# Patient Record
Sex: FEMALE | Race: WHITE
[De-identification: names, ages, dates, MRNs, and addresses within clinical notes are randomized per-mention and may not be internally consistent; named-entity substitution may affect disease eponyms.]

---

## 2017-05-11 ENCOUNTER — HOSPITAL ENCOUNTER (EMERGENCY)
Dept: HOSPITAL 8 - ED | Age: 57
Discharge: HOME | End: 2017-05-11
Payer: MEDICAID

## 2017-05-11 VITALS — WEIGHT: 178.13 LBS | BODY MASS INDEX: 27 KG/M2 | HEIGHT: 68 IN

## 2017-05-11 VITALS — SYSTOLIC BLOOD PRESSURE: 121 MMHG | DIASTOLIC BLOOD PRESSURE: 61 MMHG

## 2017-05-11 DIAGNOSIS — F17.200: ICD-10-CM

## 2017-05-11 DIAGNOSIS — I48.91: ICD-10-CM

## 2017-05-11 DIAGNOSIS — M71.21: Primary | ICD-10-CM

## 2017-05-11 DIAGNOSIS — I10: ICD-10-CM

## 2017-05-11 DIAGNOSIS — R42: ICD-10-CM

## 2017-05-11 LAB — BUN SERPL-MCNC: 15 MG/DL (ref 7–18)

## 2017-05-11 PROCEDURE — 99285 EMERGENCY DEPT VISIT HI MDM: CPT

## 2017-05-11 PROCEDURE — 93005 ELECTROCARDIOGRAM TRACING: CPT

## 2017-05-11 PROCEDURE — 93971 EXTREMITY STUDY: CPT

## 2017-05-11 PROCEDURE — 81003 URINALYSIS AUTO W/O SCOPE: CPT

## 2017-05-11 PROCEDURE — 96360 HYDRATION IV INFUSION INIT: CPT

## 2017-05-11 PROCEDURE — 82040 ASSAY OF SERUM ALBUMIN: CPT

## 2017-05-11 PROCEDURE — 36415 COLL VENOUS BLD VENIPUNCTURE: CPT

## 2017-05-11 PROCEDURE — 80048 BASIC METABOLIC PNL TOTAL CA: CPT

## 2017-05-11 PROCEDURE — 85025 COMPLETE CBC W/AUTO DIFF WBC: CPT

## 2017-06-27 ENCOUNTER — HOSPITAL ENCOUNTER (EMERGENCY)
Dept: HOSPITAL 8 - ED | Age: 57
Discharge: HOME | End: 2017-06-27
Payer: MEDICAID

## 2017-06-27 VITALS — SYSTOLIC BLOOD PRESSURE: 111 MMHG | DIASTOLIC BLOOD PRESSURE: 65 MMHG

## 2017-06-27 VITALS — BODY MASS INDEX: 26.66 KG/M2 | WEIGHT: 175.93 LBS | HEIGHT: 68 IN
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
## 2017-06-27 DIAGNOSIS — S22.41XA: Primary | ICD-10-CM
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
## 2017-06-27 DIAGNOSIS — I48.91: ICD-10-CM

## 2017-06-27 DIAGNOSIS — I10: ICD-10-CM

## 2017-06-27 DIAGNOSIS — F17.210: ICD-10-CM

## 2017-06-27 PROCEDURE — 99284 EMERGENCY DEPT VISIT MOD MDM: CPT

## 2017-11-25 ENCOUNTER — HOSPITAL ENCOUNTER (EMERGENCY)
Dept: HOSPITAL 8 - ED | Age: 57
Discharge: HOME | End: 2017-11-25
Payer: MEDICAID

## 2017-11-25 VITALS — DIASTOLIC BLOOD PRESSURE: 65 MMHG | SYSTOLIC BLOOD PRESSURE: 128 MMHG

## 2017-11-25 VITALS — WEIGHT: 171.96 LBS | BODY MASS INDEX: 26.06 KG/M2 | HEIGHT: 68 IN

## 2017-11-25 DIAGNOSIS — Z88.1: ICD-10-CM

## 2017-11-25 DIAGNOSIS — M19.90: ICD-10-CM

## 2017-11-25 DIAGNOSIS — L24.9: Primary | ICD-10-CM

## 2017-11-25 DIAGNOSIS — E03.9: ICD-10-CM

## 2017-11-25 DIAGNOSIS — I10: ICD-10-CM

## 2017-11-25 DIAGNOSIS — E78.5: ICD-10-CM

## 2017-11-25 DIAGNOSIS — I48.91: ICD-10-CM

## 2017-11-25 LAB
AST SERPL-CCNC: 27 U/L (ref 15–37)
BUN SERPL-MCNC: 14 MG/DL (ref 7–18)
HCT VFR BLD CALC: 41.8 % (ref 34.6–47.8)
HGB BLD-MCNC: 14 G/DL (ref 11.7–16.4)
PATH.CAST-FLAG: (no result)
SPERM-FLAG: (no result)
SRC-FLAG: (no result)
WBC # BLD AUTO: 6.9 X10^3/UL (ref 3.4–10)
XTAL-FLAG: (no result)
YLC-FLAG: (no result)

## 2017-11-25 PROCEDURE — 36415 COLL VENOUS BLD VENIPUNCTURE: CPT

## 2017-11-25 PROCEDURE — 85025 COMPLETE CBC W/AUTO DIFF WBC: CPT

## 2017-11-25 PROCEDURE — 99284 EMERGENCY DEPT VISIT MOD MDM: CPT

## 2017-11-25 PROCEDURE — 80053 COMPREHEN METABOLIC PANEL: CPT

## 2017-11-25 PROCEDURE — 99283 EMERGENCY DEPT VISIT LOW MDM: CPT

## 2017-11-25 PROCEDURE — 81001 URINALYSIS AUTO W/SCOPE: CPT

## 2018-01-22 ENCOUNTER — HOSPITAL ENCOUNTER (OUTPATIENT)
Dept: HOSPITAL 8 - CVU | Age: 58
Discharge: HOME | End: 2018-01-22
Attending: INTERNAL MEDICINE
Payer: MEDICAID

## 2018-01-22 DIAGNOSIS — I10: ICD-10-CM

## 2018-01-22 DIAGNOSIS — I07.1: Primary | ICD-10-CM

## 2018-01-22 DIAGNOSIS — F17.200: ICD-10-CM

## 2018-01-22 DIAGNOSIS — Z98.890: ICD-10-CM

## 2018-01-22 DIAGNOSIS — I48.91: ICD-10-CM

## 2018-01-22 PROCEDURE — 93306 TTE W/DOPPLER COMPLETE: CPT

## 2018-08-13 ENCOUNTER — HOSPITAL ENCOUNTER (EMERGENCY)
Dept: HOSPITAL 8 - ED | Age: 58
Discharge: HOME | End: 2018-08-13
Payer: MEDICAID

## 2018-08-13 VITALS — SYSTOLIC BLOOD PRESSURE: 108 MMHG | DIASTOLIC BLOOD PRESSURE: 61 MMHG

## 2018-08-13 VITALS — HEIGHT: 68 IN | WEIGHT: 163.14 LBS | BODY MASS INDEX: 24.73 KG/M2

## 2018-08-13 DIAGNOSIS — E03.9: ICD-10-CM

## 2018-08-13 DIAGNOSIS — I48.2: Primary | ICD-10-CM

## 2018-08-13 DIAGNOSIS — M19.90: ICD-10-CM

## 2018-08-13 DIAGNOSIS — I10: ICD-10-CM

## 2018-08-13 DIAGNOSIS — E78.5: ICD-10-CM

## 2018-08-13 DIAGNOSIS — F17.210: ICD-10-CM

## 2018-08-13 LAB
ALBUMIN SERPL-MCNC: 3.8 G/DL (ref 3.4–5)
ALP SERPL-CCNC: 172 U/L (ref 45–117)
ALT SERPL-CCNC: 56 U/L (ref 12–78)
ANION GAP SERPL CALC-SCNC: 8 MMOL/L (ref 5–15)
BASOPHILS # BLD AUTO: 0.11 X10^3/UL (ref 0–0.1)
BASOPHILS NFR BLD AUTO: 1 % (ref 0–1)
BILIRUB SERPL-MCNC: 1.6 MG/DL (ref 0.2–1)
CALCIUM SERPL-MCNC: 9.3 MG/DL (ref 8.5–10.1)
CHLORIDE SERPL-SCNC: 110 MMOL/L (ref 98–107)
CREAT SERPL-MCNC: 0.93 MG/DL (ref 0.55–1.02)
EOSINOPHIL # BLD AUTO: 0.14 X10^3/UL (ref 0–0.4)
EOSINOPHIL NFR BLD AUTO: 1 % (ref 1–7)
ERYTHROCYTE [DISTWIDTH] IN BLOOD BY AUTOMATED COUNT: 14.1 % (ref 9.6–15.2)
LYMPHOCYTES # BLD AUTO: 2.97 X10^3/UL (ref 1–3.4)
LYMPHOCYTES NFR BLD AUTO: 30 % (ref 22–44)
MCH RBC QN AUTO: 28.1 PG (ref 27–34.8)
MCHC RBC AUTO-ENTMCNC: 33.2 G/DL (ref 32.4–35.8)
MCV RBC AUTO: 84.7 FL (ref 80–100)
MD: NO
MONOCYTES # BLD AUTO: 0.77 X10^3/UL (ref 0.2–0.8)
MONOCYTES NFR BLD AUTO: 8 % (ref 2–9)
NEUTROPHILS # BLD AUTO: 5.91 X10^3/UL (ref 1.8–6.8)
NEUTROPHILS NFR BLD AUTO: 60 % (ref 42–75)
PLATELET # BLD AUTO: 308 X10^3/UL (ref 130–400)
PMV BLD AUTO: 9.4 FL (ref 7.4–10.4)
PROT SERPL-MCNC: 7.5 G/DL (ref 6.4–8.2)
RBC # BLD AUTO: 5.09 X10^6/UL (ref 3.82–5.3)
T4 FREE SERPL-MCNC: 1.64 NG/DL (ref 0.76–1.46)
TROPONIN I SERPL-MCNC: < 0.015 NG/ML (ref 0–0.04)
TSH SERPL-ACNC: 0.07 MIU/L (ref 0.36–3.74)

## 2018-08-13 PROCEDURE — 84439 ASSAY OF FREE THYROXINE: CPT

## 2018-08-13 PROCEDURE — 96372 THER/PROPH/DIAG INJ SC/IM: CPT

## 2018-08-13 PROCEDURE — 36415 COLL VENOUS BLD VENIPUNCTURE: CPT

## 2018-08-13 PROCEDURE — 93005 ELECTROCARDIOGRAM TRACING: CPT

## 2018-08-13 PROCEDURE — 85025 COMPLETE CBC W/AUTO DIFF WBC: CPT

## 2018-08-13 PROCEDURE — 99285 EMERGENCY DEPT VISIT HI MDM: CPT

## 2018-08-13 PROCEDURE — 84443 ASSAY THYROID STIM HORMONE: CPT

## 2018-08-13 PROCEDURE — 71045 X-RAY EXAM CHEST 1 VIEW: CPT

## 2018-08-13 PROCEDURE — 80053 COMPREHEN METABOLIC PANEL: CPT

## 2018-08-13 PROCEDURE — 83735 ASSAY OF MAGNESIUM: CPT

## 2018-08-13 PROCEDURE — 84484 ASSAY OF TROPONIN QUANT: CPT

## 2019-07-09 ENCOUNTER — OFFICE VISIT (OUTPATIENT)
Dept: NEUROLOGY | Facility: MEDICAL CENTER | Age: 59
End: 2019-07-09
Payer: MEDICAID

## 2019-07-09 VITALS
SYSTOLIC BLOOD PRESSURE: 118 MMHG | TEMPERATURE: 97.7 F | HEIGHT: 68 IN | HEART RATE: 40 BPM | BODY MASS INDEX: 25.16 KG/M2 | OXYGEN SATURATION: 99 % | DIASTOLIC BLOOD PRESSURE: 78 MMHG | WEIGHT: 166 LBS

## 2019-07-09 DIAGNOSIS — R41.3 MEMORY LOSS: ICD-10-CM

## 2019-07-09 DIAGNOSIS — I67.9 CEREBROVASCULAR DISEASE: ICD-10-CM

## 2019-07-09 PROCEDURE — 99205 OFFICE O/P NEW HI 60 MIN: CPT | Performed by: PSYCHIATRY & NEUROLOGY

## 2019-07-09 RX ORDER — ATORVASTATIN CALCIUM 40 MG/1
40 TABLET, FILM COATED ORAL NIGHTLY
COMMUNITY
End: 2021-06-07

## 2019-07-09 RX ORDER — PHENOL 1.4 %
AEROSOL, SPRAY (ML) MUCOUS MEMBRANE
COMMUNITY

## 2019-07-09 ASSESSMENT — ENCOUNTER SYMPTOMS
EYE DISCHARGE: 0
FALLS: 0
ABDOMINAL PAIN: 0
WEIGHT LOSS: 0
SORE THROAT: 0
BRUISES/BLEEDS EASILY: 0
HALLUCINATIONS: 0
SHORTNESS OF BREATH: 0
FEVER: 0

## 2019-07-09 NOTE — LETTER
[facility]   [address], [address]  [place] [address]  Phone: [phone]  Fax: [phone]              Yaneth Hendricks  1960    Encounter Date: 7/9/2019    Marion Miguel M.D.          PROGRESS NOTE:  Chief Complaint   Patient presents with   • New Patient     Disorientation, unspecified/     Patient is referred by Lis Small for initial consult.    History of present illness:  Yaneth Hendricks 59 y.o. female presents today for disorientation unspecified.   History is obtained from patient and significant other.  and Patient is accompanied by boyfriend, Pardeep.     Duration/timing: ~Summer 2018, with progression  Context: Memory loss: started off as forgetfulness, getting lost while driving in her car and not knowing where she was going, a regular physician. She got off the freeway and eventually found her way home perhaps. She has lived in [place] for over 10 years. She drives now - doesn't get lost every time, occurs daily going to doctor appointments and getting lost going home from familiar places. Forgets to turn off the stove after taking pan off the stove - she remembered then turned it off after 8 minutes x 2 in couple of months. Can't remember dates, times, appointments. 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 Responsibility at home: laundry, cooking, cleaning - will forget what she was doing and will go back to do it. She pays her car insurance and electric bill without issue - with cash without gross mistakes.   Location: memory circuits  Quality: loss  Severity: moderate   Modifying factors: worse with time  Associated signs/symptoms: hit head hard Summer 2018 at neighbor house sitting on porch and hit her head on hand rail with confusion afterwards without LOC, followed by neck pain/dizziness; intermittent anxiety but not severe  Denies: bladder incontinence, bowel incontinence,  dizziness, headaches, vision changes, weakness, numbness/tingling, swallowing difficulties, speech disturbance, incoordination, hearing loss, loss of consciousness, hallucinations, REM behavior disorder, seizures, abnormal movements, falls, snoring/apnea during sleep, HIV or syphilis risk factors and loss of gaps in time, staring spells     Past medical history:   Past Medical History:   Diagnosis Date   • Atrial fibrillation with RVR (HCC)     with cardioversion   • Hypertension    • Hypothyroid      Past surgical history:   Past Surgical History:   Procedure Laterality Date   • RECOVERY  4/8/2013    Performed by [facility]   • APPENDECTOMY      2006   • OTHER CARDIAC SURGERY      ablation, open heart   • PB ANESTH,OPEN HEART SURGERY+PUMP      ASD defect     Family history:   Family History   Problem Relation Age of Onset   • Cancer Mother 70        brain   • Suicide Attempts Father    • Suicide Attempts Brother    • Suicide Attempts Son     No dementia in the family.     Social history:   Social History   • Marital status:      Social History Main Topics   • Smoking status: Current Every Day Smoker     Packs/day: 0.50     Types: Cigarettes   • Smokeless tobacco: Never Used   • Alcohol use Yes      Comment: occasional; no hx of EtOH abuse   • Drug use: No       Current medications:   Current Outpatient Prescriptions   Medication   • hydrocodone-acetaminophen (NORCO) 5-325 MG Tab per tablet   • levothyroxine (SYNTHROID) 88 MCG TABS   • metoprolol (LOPRESSOR) 50 MG TABS   • pravastatin (PRAVACHOL) 80 MG tablet   • lisinopril (PRINIVIL) 20 MG TABS   • promethazine-codeine (PHENERGAN-CODEINE) 6.25-10 MG/5ML SYRP   • albuterol (VENTOLIN OR PROVENTIL) 108 (90 BASE) MCG/ACT AERS   • dabigatran (PRADAXA) 150 MG CAPS capsule   • doxycycline (VIBRAMYCIN) 100 MG TABS   • POTASSIUM PO   • Multiple Vitamins-Minerals (MULTIVITAMIN PO)     No current facility-administered medications  "for this visit.        Medication Allergy:  Allergies   Allergen Reactions   • Codeine Vomiting       Review of Systems   Constitutional: Negative for fever and weight loss.   HENT: Negative for sore throat.    Eyes: Negative for discharge.   Respiratory: Negative for shortness of breath.    Cardiovascular: Negative for leg swelling.   Gastrointestinal: Negative for abdominal pain.   Genitourinary: Negative for dysuria.   Musculoskeletal: Negative for falls.   Skin: Negative for rash.   Neurological:        As per HPI   Endo/Heme/Allergies: Does not bruise/bleed easily.   Psychiatric/Behavioral: Negative for hallucinations.       Physical examination:   Vitals:    07/09/19 0754   BP: 118/78   BP Location: Left arm   Patient Position: Sitting   BP Cuff Size: Adult   Pulse: (!) 40   Temp: 36.5 °C (97.7 °F)   TempSrc: Temporal   SpO2: 99%   Weight: 75.3 kg (166 lb)   Height: 1.727 m (5' 8\")     General: Patient in well nourished in no apparent distress.  Eyes: Ophthalmoscopic examination performed but discs cannot be visualized well enough to characterize bilaterally.  HENT: Normocephalic, atraumatic. Mallapatic score 3  Cardiovascular: No lower extremity edema.  Respiratory: Normal respiratory effort.   Skin: No appreciable signs of acute rashes or bruising.   Musculoskeletal: No signs of joint or muscle swelling.   Psychiatric: Pleasant.     NEUROLOGICAL EXAM:   Mental status: Awake, alert and fully oriented to person, place and time. Normal attention and concentration.   Speech and language: Speech is fluent without errors and clear.  Cranial nerve exam:  II: Pupils are equally round and reactive to light. Visual fields are intact by confrontation.  III, IV, VI: EOMI, no diplopia, no ptosis.  V: Sensation to light touch is normal over V1-3 distributions bilaterally.  .  VII: Facial movements are symmetrical. There is no facial droop. .  VIII: Hearing intact to soft speech and finger rub bilaterally  IX: Palate " elevates symmetrically, uvula is midline. Dysarthria is not present.  XI: Shoulder shrug are symmetrical and strong.   XII: Tongue protrudes midline.    Motor exam:  Muscle tone is normal in all 4 limbs. and No abnormal movements appreciated.    Muscle strength:     Right  Left  Deltoid   5/5  5/5      Biceps   5/5  5/5  Triceps  5/5  5/5   Wrist extensors 5/5  5/5  Wrist flexors  5/5  5/5     5/5  5/5  Interossei  5/5  5/5  Thenar (APB)  NT/5  NT/5   Hip flexors  5/5  5/5  Quadriceps  5/5  5/5    Hamstrings  5/5  5/5  Dorsiflexors  5/5  5/5  Plantarflexors  5/5  5/5  Toe extension  NT/5  NT/5  NT = not tested    Sensory exam:  Intact to Light touch, Temperature and Vibration in bilateral upper and lower extremity.    Deep tendon reflexes:       Right  Left  Biceps   1/4  1/4  Triceps  1/4  1/4  Brachioradialis 1/4  1/4  Knee jerk  1/4  1/4  Ankle jerk  0/4  0/4   bilateral toes are downgoing to plantar stimulation..    Coordination: shows a normal finger-nose-finger and heel to shin bilaterally.   Gait: Casual gait is normal., Heel walk is normal. and Toe walk is normal.      ANCILLARY DATA REVIEWED:   Lab Data Review:  Lab Results   Component Value Date/Time    WBC 7.7 04/06/2013 02:30 AM    RBC 4.48 04/06/2013 02:30 AM    HEMOGLOBIN 12.4 04/06/2013 02:30 AM    HEMATOCRIT 38.7 04/06/2013 02:30 AM    MCV 86.5 04/06/2013 02:30 AM    MCH 27.7 04/06/2013 02:30 AM    MCHC 32.0 04/06/2013 02:30 AM    MPV 9.7 04/06/2013 02:30 AM    NEUTSPOLYS 55.1 04/06/2013 02:30 AM    LYMPHOCYTES 32.8 04/06/2013 02:30 AM    MONOCYTES 9.4 (H) 04/06/2013 02:30 AM    EOSINOPHILS 2.1 04/06/2013 02:30 AM    BASOPHILS 0.6 04/06/2013 02:30 AM    HYPOCHROMIA 1+ 04/06/2013 02:30 AM      Lab Results   Component Value Date/Time    SODIUM 138 04/06/2013 02:30 AM    POTASSIUM 3.9 04/06/2013 02:30 AM    CHLORIDE 111 04/06/2013 02:30 AM    CO2 22 04/06/2013 02:30 AM    GLUCOSE 91 04/06/2013 02:30 AM    BUN 12 04/06/2013 02:30 AM    CREATININE  0.59 04/06/2013 02:30 AM       Lab Results  Component Value Date/Time   ASTSGOT 52 (H) 04/05/2013 1435   ALTSGPT 44 04/05/2013 1435   ALKPHOSPHAT 117 (H) 04/05/2013 1435   ALBUMIN 3.9 04/05/2013 1435 2013 TSH was elevated at 7.5 with normal free T4.    Imaging:  CT head without contrast April 2013 performed for syncope:   1. Negative noncontrast CT scan of the brain.  2. Mild paranasal sinus mucosal thickening.    Echocardiogram 2013:  ASD repair no shunt seen by color Doppler.  Mildly enlarged RV w normal function.  Mildly dilated right atrium.  Left ventricular ejection fraction is 55% to 60%.  Right ventricular systolic pressure is estimated to be 42-47 mmHg   consistent with moderate pulmonary hypertension.    MRI brain with/without 9/2018 performed for disorientation:  Mild to mod diffuse volume loss and mod to severe chronic microvascular ischemic changes.    CT head without 7/5/2019: chronic ischemic changes as per MRI brain, no acute finding    Records reviewed: Chart reviewed patient was seen in the emergency department in 2016 for knee pain.  Media tablet reviewed as well, no primary care notes available.        ASSESSMENT AND PLAN:    1. Memory loss: Suspicious for vascular dementia given processing difficulties and mostly executive function difficulties, moderate to severe chronic microvascular ischemic changes on MRI brain in September 2018.  Cannot rule out other neurodegenerative disease, metabolic cause-will work-up as below.  There is a concern for driving due to her reaction time so we will send her for occupational therapy for driving assessment and safety.  No other red flag symptoms or rapidly progressive symptoms suggest need for lumbar puncture at this point in time but may consider if there is change in clinical presentation.  - HIV AG/AB COMBO ASSAY SCREENING; Future  - RPR (SYPHILIS); Future  - VITAMIN B12; Future  - METHYLMALONIC ACID, SERUM; Future  - COPPER, SERUM; Future  - TSH  REFLEX TO T4  - REFERRAL TO NEURODIAGNOSTICS (EEG,EP,EMG/NCS/DBS) Modality Requested: EEG  - MR-BRAIN-W/O; Future-repeat due to severe worsening nature to reevaluate progression given her multiple vascular risk factors  - REFERRAL TO OCCUPATIONAL THERAPY Reason for Therapy: Eval/Treat/Report  -DMV form filled out and faxed, recommend patient hold off from driving until full thorough evaluation  -After work-up will likely need neuropsychological testing    2. Cerebrovascular disease: Evident on MRI brain in 2018 with moderate to severe microvascular ischemic changes given her history of hyperlipidemia, hypertension, history of atrial flutter now resolved and not on anticoagulation, smoking.  -Highly recommended smoking cessation  -Continue atorvastatin   -Optimization of blood pressure control of her primary care  -Recommend daily aspirin for stroke prevention    FOLLOW-UP: Return for 3-4 months.  To review work-up  EDUCATION AND COUNSELING:  -I personally discussed the following with the patient:   Diagnosis, prognosis, and treatment options discussed with patient at length.  , Recommend regular exercise, proper hydration, healthy diet and stress reduction. , Smoking cessation was discussed. , I have made the patient aware of mandatory reporting required by the law in the State of [place] regarding episodes of seizures, loss of consciousness, alteration of awareness, and/or concerns for driving safety as discussed today. The patient and family are responsible for reporting events to the DMV, instructions were provided. The patient verbalized understanding.  and Differential diagnosis    The patient understands and agrees that due to the complexity of his/her diagnosis, results of any testing and further recommendations will typically be discussed/made during a face to face encounter in my office. The patient and/or family further understands it is their responsibility to keep proper follow up.     Disclaimer  This  dictation was created using voice recognition software. I have made every reasonable attempt to avoid dictation errors, but this document may contain an error not identified before finalizing. If the error changes the accuracy of the document, I would appreciate it being brought to my attention. Thank you very much.     Marion Miguel MD  Neurology, Neurophysiology  [facility]      No Recipients

## 2019-07-09 NOTE — PROGRESS NOTES
Chief Complaint   Patient presents with   • New Patient     Disorientation, unspecified/     Patient is referred by Lis Small for initial consult.    History of present illness:  Yaneth Hendricks 59 y.o. female presents today for disorientation unspecified.   History is obtained from patient and significant other.  and Patient is accompanied by boyfriend, Pardeep.     Duration/timing: ~Summer 2018, with progression  Context: Memory loss: started off as forgetfulness, getting lost while driving in her car and not knowing where she was going, a regular physician. She got off the freeway and eventually found her way home perhaps. She has lived in [place] for over 10 years. She drives now - doesn't get lost every time, occurs daily going to doctor appointments and getting lost going home from familiar places. Forgets to turn off the stove after taking pan off the stove - she remembered then turned it off after 8 minutes x 2 in couple of months. Can't remember dates, times, appointments. 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 Responsibility at home: laundry, cooking, cleaning - will forget what she was doing and will go back to do it. She pays her car insurance and electric bill without issue - with cash without gross mistakes.   Location: memory circuits  Quality: loss  Severity: moderate   Modifying factors: worse with time  Associated signs/symptoms: hit head hard Summer 2018 at neighbor house sitting on porch and hit her head on hand rail with confusion afterwards without LOC, followed by neck pain/dizziness; intermittent anxiety but not severe  Denies: bladder incontinence, bowel incontinence, dizziness, headaches, vision changes, weakness, numbness/tingling, swallowing difficulties, speech disturbance, incoordination, hearing loss, loss of consciousness, hallucinations, REM behavior disorder, seizures, abnormal movements, falls,  snoring/apnea during sleep, HIV or syphilis risk factors and loss of gaps in time, staring spells     Past medical history:   Past Medical History:   Diagnosis Date   • Atrial fibrillation with RVR (HCC)     with cardioversion   • Hypertension    • Hypothyroid      Past surgical history:   Past Surgical History:   Procedure Laterality Date   • RECOVERY  4/8/2013    Performed by [facility]   • APPENDECTOMY      2006   • OTHER CARDIAC SURGERY      ablation, open heart   • PB ANESTH,OPEN HEART SURGERY+PUMP      ASD defect     Family history:   Family History   Problem Relation Age of Onset   • Cancer Mother 70        brain   • Suicide Attempts Father    • Suicide Attempts Brother    • Suicide Attempts Son     No dementia in the family.     Social history:   Social History   • Marital status:      Social History Main Topics   • Smoking status: Current Every Day Smoker     Packs/day: 0.50     Types: Cigarettes   • Smokeless tobacco: Never Used   • Alcohol use Yes      Comment: occasional; no hx of EtOH abuse   • Drug use: No       Current medications:   Current Outpatient Prescriptions   Medication   • hydrocodone-acetaminophen (NORCO) 5-325 MG Tab per tablet   • levothyroxine (SYNTHROID) 88 MCG TABS   • metoprolol (LOPRESSOR) 50 MG TABS   • pravastatin (PRAVACHOL) 80 MG tablet   • lisinopril (PRINIVIL) 20 MG TABS   • promethazine-codeine (PHENERGAN-CODEINE) 6.25-10 MG/5ML SYRP   • albuterol (VENTOLIN OR PROVENTIL) 108 (90 BASE) MCG/ACT AERS   • dabigatran (PRADAXA) 150 MG CAPS capsule   • doxycycline (VIBRAMYCIN) 100 MG TABS   • POTASSIUM PO   • Multiple Vitamins-Minerals (MULTIVITAMIN PO)     No current facility-administered medications for this visit.        Medication Allergy:  Allergies   Allergen Reactions   • Codeine Vomiting       Review of Systems   Constitutional: Negative for fever and weight loss.   HENT: Negative for sore throat.    Eyes: Negative for discharge.  "  Respiratory: Negative for shortness of breath.    Cardiovascular: Negative for leg swelling.   Gastrointestinal: Negative for abdominal pain.   Genitourinary: Negative for dysuria.   Musculoskeletal: Negative for falls.   Skin: Negative for rash.   Neurological:        As per HPI   Endo/Heme/Allergies: Does not bruise/bleed easily.   Psychiatric/Behavioral: Negative for hallucinations.       Physical examination:   Vitals:    07/09/19 0754   BP: 118/78   BP Location: Left arm   Patient Position: Sitting   BP Cuff Size: Adult   Pulse: (!) 40   Temp: 36.5 °C (97.7 °F)   TempSrc: Temporal   SpO2: 99%   Weight: 75.3 kg (166 lb)   Height: 1.727 m (5' 8\")     General: Patient in well nourished in no apparent distress.  Eyes: Ophthalmoscopic examination performed but discs cannot be visualized well enough to characterize bilaterally.  HENT: Normocephalic, atraumatic. Mallapatic score 3  Cardiovascular: No lower extremity edema.  Respiratory: Normal respiratory effort.   Skin: No appreciable signs of acute rashes or bruising.   Musculoskeletal: No signs of joint or muscle swelling.   Psychiatric: Pleasant.     NEUROLOGICAL EXAM:   Mental status: Awake, alert and fully oriented to person, place and time. Normal attention and concentration.   Speech and language: Speech is fluent without errors and clear.  Cranial nerve exam:  II: Pupils are equally round and reactive to light. Visual fields are intact by confrontation.  III, IV, VI: EOMI, no diplopia, no ptosis.  V: Sensation to light touch is normal over V1-3 distributions bilaterally.  .  VII: Facial movements are symmetrical. There is no facial droop. .  VIII: Hearing intact to soft speech and finger rub bilaterally  IX: Palate elevates symmetrically, uvula is midline. Dysarthria is not present.  XI: Shoulder shrug are symmetrical and strong.   XII: Tongue protrudes midline.    Motor exam:  Muscle tone is normal in all 4 limbs. and No abnormal movements " appreciated.    Muscle strength:     Right  Left  Deltoid   5/5  5/5      Biceps   5/5  5/5  Triceps  5/5  5/5   Wrist extensors 5/5  5/5  Wrist flexors  5/5  5/5     5/5  5/5  Interossei  5/5  5/5  Thenar (APB)  NT/5  NT/5   Hip flexors  5/5  5/5  Quadriceps  5/5  5/5    Hamstrings  5/5  5/5  Dorsiflexors  5/5  5/5  Plantarflexors  5/5  5/5  Toe extension  NT/5  NT/5  NT = not tested    Sensory exam:  Intact to Light touch, Temperature and Vibration in bilateral upper and lower extremity.    Deep tendon reflexes:       Right  Left  Biceps   1/4 1/4  Triceps  1/4 1/4  Brachioradialis 1/4 1/4  Knee jerk  1/4 1/4  Ankle jerk  0/4  0/4   bilateral toes are downgoing to plantar stimulation..    Coordination: shows a normal finger-nose-finger and heel to shin bilaterally.   Gait: Casual gait is normal., Heel walk is normal. and Toe walk is normal.      ANCILLARY DATA REVIEWED:   Lab Data Review:  Lab Results   Component Value Date/Time    WBC 7.7 04/06/2013 02:30 AM    RBC 4.48 04/06/2013 02:30 AM    HEMOGLOBIN 12.4 04/06/2013 02:30 AM    HEMATOCRIT 38.7 04/06/2013 02:30 AM    MCV 86.5 04/06/2013 02:30 AM    MCH 27.7 04/06/2013 02:30 AM    MCHC 32.0 04/06/2013 02:30 AM    MPV 9.7 04/06/2013 02:30 AM    NEUTSPOLYS 55.1 04/06/2013 02:30 AM    LYMPHOCYTES 32.8 04/06/2013 02:30 AM    MONOCYTES 9.4 (H) 04/06/2013 02:30 AM    EOSINOPHILS 2.1 04/06/2013 02:30 AM    BASOPHILS 0.6 04/06/2013 02:30 AM    HYPOCHROMIA 1+ 04/06/2013 02:30 AM      Lab Results   Component Value Date/Time    SODIUM 138 04/06/2013 02:30 AM    POTASSIUM 3.9 04/06/2013 02:30 AM    CHLORIDE 111 04/06/2013 02:30 AM    CO2 22 04/06/2013 02:30 AM    GLUCOSE 91 04/06/2013 02:30 AM    BUN 12 04/06/2013 02:30 AM    CREATININE 0.59 04/06/2013 02:30 AM       Lab Results  Component Value Date/Time   ASTSGOT 52 (H) 04/05/2013 1435   ALTSGPT 44 04/05/2013 1435   ALKPHOSPHAT 117 (H) 04/05/2013 1435   ALBUMIN 3.9 04/05/2013 1435 2013 TSH was elevated at 7.5  with normal free T4.    Imaging:  CT head without contrast April 2013 performed for syncope:   1. Negative noncontrast CT scan of the brain.  2. Mild paranasal sinus mucosal thickening.    Echocardiogram 2013:  ASD repair no shunt seen by color Doppler.  Mildly enlarged RV w normal function.  Mildly dilated right atrium.  Left ventricular ejection fraction is 55% to 60%.  Right ventricular systolic pressure is estimated to be 42-47 mmHg   consistent with moderate pulmonary hypertension.    MRI brain with/without 9/2018 performed for disorientation:  Mild to mod diffuse volume loss and mod to severe chronic microvascular ischemic changes.    CT head without 7/5/2019: chronic ischemic changes as per MRI brain, no acute finding    Records reviewed: Chart reviewed patient was seen in the emergency department in 2016 for knee pain.  Media tablet reviewed as well, no primary care notes available.        ASSESSMENT AND PLAN:    1. Memory loss: Suspicious for vascular dementia given processing difficulties and mostly executive function difficulties, moderate to severe chronic microvascular ischemic changes on MRI brain in September 2018.  Cannot rule out other neurodegenerative disease, metabolic cause-will work-up as below.  There is a concern for driving due to her reaction time so we will send her for occupational therapy for driving assessment and safety.  No other red flag symptoms or rapidly progressive symptoms suggest need for lumbar puncture at this point in time but may consider if there is change in clinical presentation.  - HIV AG/AB COMBO ASSAY SCREENING; Future  - RPR (SYPHILIS); Future  - VITAMIN B12; Future  - METHYLMALONIC ACID, SERUM; Future  - COPPER, SERUM; Future  - TSH REFLEX TO T4  - REFERRAL TO NEURODIAGNOSTICS (EEG,EP,EMG/NCS/DBS) Modality Requested: EEG  - MR-BRAIN-W/O; Future-repeat due to severe worsening nature to reevaluate progression given her multiple vascular risk factors  - REFERRAL TO  OCCUPATIONAL THERAPY Reason for Therapy: Eval/Treat/Report  -DMV form filled out and faxed, recommend patient hold off from driving until full thorough evaluation  -After work-up will likely need neuropsychological testing    2. Cerebrovascular disease: Evident on MRI brain in 2018 with moderate to severe microvascular ischemic changes given her history of hyperlipidemia, hypertension, history of atrial flutter now resolved and not on anticoagulation, smoking.  -Highly recommended smoking cessation  -Continue atorvastatin   -Optimization of blood pressure control of her primary care  -Recommend daily aspirin for stroke prevention    FOLLOW-UP: Return for 3-4 months.  To review work-up  EDUCATION AND COUNSELING:  -I personally discussed the following with the patient:   Diagnosis, prognosis, and treatment options discussed with patient at length.  , Recommend regular exercise, proper hydration, healthy diet and stress reduction. , Smoking cessation was discussed. , I have made the patient aware of mandatory reporting required by the law in the [facility] regarding episodes of seizures, loss of consciousness, alteration of awareness, and/or concerns for driving safety as discussed today. The patient and family are responsible for reporting events to the DMV, instructions were provided. The patient verbalized understanding.  and Differential diagnosis    The patient understands and agrees that due to the complexity of his/her diagnosis, results of any testing and further recommendations will typically be discussed/made during a face to face encounter in my office. The patient and/or family further understands it is their responsibility to keep proper follow up.     Disclaimer  This dictation was created using voice recognition software. I have made every reasonable attempt to avoid dictation errors, but this document may contain an error not identified before finalizing. If the error changes the accuracy of the  document, I would appreciate it being brought to my attention. Thank you very much.     Marion Miguel MD  Neurology, Neurophysiology  [facility]

## 2019-07-21 ENCOUNTER — HOSPITAL ENCOUNTER (OUTPATIENT)
Dept: RADIOLOGY | Facility: MEDICAL CENTER | Age: 59
End: 2019-07-21
Attending: PSYCHIATRY & NEUROLOGY
Payer: MEDICAID

## 2019-07-21 DIAGNOSIS — R41.3 MEMORY LOSS: ICD-10-CM

## 2019-07-21 PROCEDURE — 70551 MRI BRAIN STEM W/O DYE: CPT

## 2019-07-23 ENCOUNTER — DOCUMENTATION (OUTPATIENT)
Dept: NEUROLOGY | Facility: MEDICAL CENTER | Age: 59
End: 2019-07-23

## 2019-07-23 NOTE — PROGRESS NOTES
[facility] laboratory received July 2019.    Work-up as follows: Copper 142, normal  B12 362, normal MMA at 136  TSH slightly elevated 5.3 with normal free T4 1.5  HIV and RPR negative    Maroin Miguel MD  Neurology - Neurophysiology  [facility]

## 2019-09-04 ENCOUNTER — APPOINTMENT (OUTPATIENT)
Dept: OCCUPATIONAL THERAPY | Facility: REHABILITATION | Age: 59
End: 2019-09-04
Attending: PSYCHIATRY & NEUROLOGY
Payer: MEDICAID

## 2019-09-30 ENCOUNTER — OCCUPATIONAL THERAPY (OUTPATIENT)
Dept: OCCUPATIONAL THERAPY | Facility: REHABILITATION | Age: 59
End: 2019-09-30
Attending: PSYCHIATRY & NEUROLOGY
Payer: MEDICAID

## 2019-09-30 DIAGNOSIS — R41.3 MEMORY LOSS: ICD-10-CM

## 2019-09-30 PROCEDURE — 97530 THERAPEUTIC ACTIVITIES: CPT

## 2019-09-30 ASSESSMENT — BALANCE ASSESSMENTS
BALANCE - STANDING DYNAMIC: GOOD
BALANCE - SITTING STATIC: NORMAL
BALANCE - STANDING STATIC: GOOD
BALANCE - SITTING DYNAMIC: NORMAL
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
## 2019-10-01 ENCOUNTER — OFFICE VISIT (OUTPATIENT)
Dept: NEUROLOGY | Facility: MEDICAL CENTER | Age: 59
End: 2019-10-01
Payer: MEDICAID

## 2019-10-01 VITALS
OXYGEN SATURATION: 95 % | RESPIRATION RATE: 16 BRPM | HEIGHT: 68 IN | TEMPERATURE: 98.3 F | WEIGHT: 167 LBS | SYSTOLIC BLOOD PRESSURE: 94 MMHG | BODY MASS INDEX: 25.31 KG/M2 | HEART RATE: 75 BPM | DIASTOLIC BLOOD PRESSURE: 50 MMHG

## 2019-10-01 DIAGNOSIS — R41.3 MEMORY LOSS: ICD-10-CM

## 2019-10-01 PROCEDURE — 99214 OFFICE O/P EST MOD 30 MIN: CPT | Performed by: PSYCHIATRY & NEUROLOGY

## 2019-10-01 ASSESSMENT — ENCOUNTER SYMPTOMS
WEIGHT LOSS: 0
NERVOUS/ANXIOUS: 0
SORE THROAT: 0
FALLS: 0
SHORTNESS OF BREATH: 0
FEVER: 0
HALLUCINATIONS: 0
DEPRESSION: 0

## 2019-10-01 ASSESSMENT — PATIENT HEALTH QUESTIONNAIRE - PHQ9: CLINICAL INTERPRETATION OF PHQ2 SCORE: 0
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
## 2019-11-15 ENCOUNTER — HOSPITAL ENCOUNTER (EMERGENCY)
Dept: HOSPITAL 8 - ED | Age: 59
Discharge: HOME | End: 2019-11-15
Payer: MEDICAID

## 2019-11-15 VITALS — HEIGHT: 68 IN | BODY MASS INDEX: 25.59 KG/M2 | WEIGHT: 168.87 LBS

## 2019-11-15 VITALS — DIASTOLIC BLOOD PRESSURE: 53 MMHG | SYSTOLIC BLOOD PRESSURE: 116 MMHG

## 2019-11-15 DIAGNOSIS — H81.10: ICD-10-CM

## 2019-11-15 DIAGNOSIS — E78.5: ICD-10-CM

## 2019-11-15 DIAGNOSIS — R51: ICD-10-CM

## 2019-11-15 DIAGNOSIS — I10: ICD-10-CM

## 2019-11-15 DIAGNOSIS — E03.9: ICD-10-CM

## 2019-11-15 DIAGNOSIS — H81.399: Primary | ICD-10-CM

## 2019-11-15 DIAGNOSIS — I48.91: ICD-10-CM

## 2019-11-15 DIAGNOSIS — I25.2: ICD-10-CM

## 2019-11-15 LAB
ALBUMIN SERPL-MCNC: 3.4 G/DL (ref 3.4–5)
ALP SERPL-CCNC: 150 U/L (ref 45–117)
ALT SERPL-CCNC: 42 U/L (ref 12–78)
ANION GAP SERPL CALC-SCNC: 4 MMOL/L (ref 5–15)
BASOPHILS # BLD AUTO: 0.04 X10^3/UL (ref 0–0.1)
BASOPHILS NFR BLD AUTO: 1 % (ref 0–1)
BILIRUB SERPL-MCNC: 1.2 MG/DL (ref 0.2–1)
CALCIUM SERPL-MCNC: 9.1 MG/DL (ref 8.5–10.1)
CHLORIDE SERPL-SCNC: 111 MMOL/L (ref 98–107)
CREAT SERPL-MCNC: 0.73 MG/DL (ref 0.55–1.02)
CULTURE INDICATED?: NO
EOSINOPHIL # BLD AUTO: 0.08 X10^3/UL (ref 0–0.4)
EOSINOPHIL NFR BLD AUTO: 1 % (ref 1–7)
ERYTHROCYTE [DISTWIDTH] IN BLOOD BY AUTOMATED COUNT: 13.5 % (ref 9.6–15.2)
LYMPHOCYTES # BLD AUTO: 1.96 X10^3/UL (ref 1–3.4)
LYMPHOCYTES NFR BLD AUTO: 30 % (ref 22–44)
MCH RBC QN AUTO: 29 PG (ref 27–34.8)
MCHC RBC AUTO-ENTMCNC: 33.1 G/DL (ref 32.4–35.8)
MCV RBC AUTO: 87.4 FL (ref 80–100)
MD: NO
MICROSCOPIC: (no result)
MONOCYTES # BLD AUTO: 0.45 X10^3/UL (ref 0.2–0.8)
MONOCYTES NFR BLD AUTO: 7 % (ref 2–9)
NEUTROPHILS # BLD AUTO: 3.95 X10^3/UL (ref 1.8–6.8)
NEUTROPHILS NFR BLD AUTO: 61 % (ref 42–75)
PLATELET # BLD AUTO: 308 X10^3/UL (ref 130–400)
PMV BLD AUTO: 9.3 FL (ref 7.4–10.4)
PROT SERPL-MCNC: 6.9 G/DL (ref 6.4–8.2)
RBC # BLD AUTO: 4.55 X10^6/UL (ref 3.82–5.3)
TROPONIN I SERPL-MCNC: < 0.015 NG/ML (ref 0–0.04)

## 2019-11-15 PROCEDURE — 93880 EXTRACRANIAL BILAT STUDY: CPT

## 2019-11-15 PROCEDURE — 84484 ASSAY OF TROPONIN QUANT: CPT

## 2019-11-15 PROCEDURE — 93005 ELECTROCARDIOGRAM TRACING: CPT

## 2019-11-15 PROCEDURE — 36415 COLL VENOUS BLD VENIPUNCTURE: CPT

## 2019-11-15 PROCEDURE — 70450 CT HEAD/BRAIN W/O DYE: CPT

## 2019-11-15 PROCEDURE — 85025 COMPLETE CBC W/AUTO DIFF WBC: CPT

## 2019-11-15 PROCEDURE — 83690 ASSAY OF LIPASE: CPT

## 2019-11-15 PROCEDURE — 80053 COMPREHEN METABOLIC PANEL: CPT

## 2019-11-15 PROCEDURE — 81003 URINALYSIS AUTO W/O SCOPE: CPT

## 2019-11-15 PROCEDURE — 71045 X-RAY EXAM CHEST 1 VIEW: CPT

## 2019-11-15 PROCEDURE — 99284 EMERGENCY DEPT VISIT MOD MDM: CPT

## 2019-11-15 NOTE — NUR
PT AMBULATED TO RESTROOM WITH STEADY GAIT. DENIES DIZZINESS AT THIS TIME. PT 
BACK ON [facility] CONNECTED TO MONITORING.

## 2019-11-15 NOTE — NUR
US AT BEDSIDE.

-------------------------------------------------------------------------------

Addendum: 11/15/19 at 1208 by [email or web address]

-------------------------------------------------------------------------------

PT RESTING COMFORTABLY ON SERA YEUNG

## 2019-12-29 ENCOUNTER — HOSPITAL ENCOUNTER (EMERGENCY)
Dept: HOSPITAL 8 - ED | Age: 59
Discharge: HOME | End: 2019-12-29
Payer: MEDICAID

## 2019-12-29 VITALS — DIASTOLIC BLOOD PRESSURE: 60 MMHG | SYSTOLIC BLOOD PRESSURE: 91 MMHG

## 2019-12-29 VITALS — WEIGHT: 166.67 LBS | BODY MASS INDEX: 25.26 KG/M2 | HEIGHT: 68 IN
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
## 2019-12-29 DIAGNOSIS — E03.9: ICD-10-CM

## 2019-12-29 DIAGNOSIS — I10: ICD-10-CM

## 2019-12-29 DIAGNOSIS — S32.039A: Primary | ICD-10-CM
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
## 2019-12-29 DIAGNOSIS — M19.90: ICD-10-CM
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
## 2019-12-29 DIAGNOSIS — F17.200: ICD-10-CM

## 2019-12-29 DIAGNOSIS — E78.5: ICD-10-CM

## 2019-12-29 DIAGNOSIS — S70.02XA: ICD-10-CM

## 2019-12-29 DIAGNOSIS — M54.6: ICD-10-CM

## 2019-12-29 DIAGNOSIS — I48.91: ICD-10-CM

## 2019-12-29 LAB
CULTURE INDICATED?: NO
MICROSCOPIC: (no result)

## 2019-12-29 PROCEDURE — 72220 X-RAY EXAM SACRUM TAILBONE: CPT

## 2019-12-29 PROCEDURE — 72110 X-RAY EXAM L-2 SPINE 4/>VWS: CPT

## 2019-12-29 PROCEDURE — 99284 EMERGENCY DEPT VISIT MOD MDM: CPT

## 2019-12-29 PROCEDURE — 72072 X-RAY EXAM THORAC SPINE 3VWS: CPT

## 2019-12-29 PROCEDURE — 81003 URINALYSIS AUTO W/O SCOPE: CPT

## 2019-12-29 NOTE — NUR
PT BACK FROM RADIOLOGY, PT INSTRUCTED TO PROVIDE CLEAN CATCH UA. PT UP TO 
BATHROOM WITH STEADY GAIT.

## 2020-04-22 ENCOUNTER — TELEPHONE (OUTPATIENT)
Dept: NEUROLOGY | Facility: MEDICAL CENTER | Age: 60
End: 2020-04-22

## 2020-04-22 NOTE — TELEPHONE ENCOUNTER
Patient was scheduled for 05/04/2020 she did not want to do telemed visit , she was upset and will call us back when the schedule opens up .

## 2020-05-04 ENCOUNTER — APPOINTMENT (OUTPATIENT)
Dept: NEUROLOGY | Facility: MEDICAL CENTER | Age: 60
End: 2020-05-04
Payer: MEDICAID

## 2020-06-05 ENCOUNTER — TELEPHONE (OUTPATIENT)
Dept: NEUROLOGY | Facility: MEDICAL CENTER | Age: 60
End: 2020-06-05

## 2020-06-05 ENCOUNTER — OFFICE VISIT (OUTPATIENT)
Dept: NEUROLOGY | Facility: MEDICAL CENTER | Age: 60
End: 2020-06-05
Payer: MEDICAID

## 2020-06-05 VITALS
BODY MASS INDEX: 24.66 KG/M2 | HEIGHT: 68 IN | HEART RATE: 59 BPM | TEMPERATURE: 97.9 F | SYSTOLIC BLOOD PRESSURE: 128 MMHG | DIASTOLIC BLOOD PRESSURE: 84 MMHG | OXYGEN SATURATION: 98 % | WEIGHT: 162.7 LBS | RESPIRATION RATE: 15 BRPM

## 2020-06-05 DIAGNOSIS — R41.3 MEMORY LOSS: ICD-10-CM

## 2020-06-05 DIAGNOSIS — Z72.0 TOBACCO USE: ICD-10-CM

## 2020-06-05 PROCEDURE — 99214 OFFICE O/P EST MOD 30 MIN: CPT | Performed by: PSYCHIATRY & NEUROLOGY

## 2020-06-05 ASSESSMENT — ENCOUNTER SYMPTOMS
HALLUCINATIONS: 0
SHORTNESS OF BREATH: 0
NERVOUS/ANXIOUS: 0
DEPRESSION: 0
WEIGHT LOSS: 0
FALLS: 0
FEVER: 0

## 2020-06-05 NOTE — TELEPHONE ENCOUNTER
Called for a faxed report from [facility] , left a detailed message for them to fax over the most recent report.
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
## 2020-06-08 ENCOUNTER — TELEPHONE (OUTPATIENT)
Dept: NEUROLOGY | Facility: MEDICAL CENTER | Age: 60
End: 2020-06-08

## 2020-06-08 NOTE — TELEPHONE ENCOUNTER
Spoke with patient she was notified that her insurance carrier will not cover the [facility] . She verbally understood and stated she is okay to be referred to any clinic. I asked her which one and she told me doesn't matter whatever Dr Miguel wants to do .

## 2020-06-08 NOTE — TELEPHONE ENCOUNTER
----- Message from Marion Miguel M.D. sent at 6/8/2020  2:46 PM PDT -----  Patient's insurance carrier will only approve a consult in [place] (probably [facility]) for at [facility] in [place].  They will not approve the [facility] referral.  Please ask her which she prefers so I can refer her.

## 2020-07-08 ENCOUNTER — OCCUPATIONAL THERAPY (OUTPATIENT)
Dept: OCCUPATIONAL THERAPY | Facility: REHABILITATION | Age: 60
End: 2020-07-08
Attending: PSYCHIATRY & NEUROLOGY
Payer: MEDICAID

## 2020-07-08 DIAGNOSIS — R41.3 MEMORY LOSS: ICD-10-CM

## 2020-07-08 PROCEDURE — 97530 THERAPEUTIC ACTIVITIES: CPT

## 2020-07-08 ASSESSMENT — BALANCE ASSESSMENTS
BALANCE - SITTING STATIC: NORMAL
BALANCE - STANDING DYNAMIC: GOOD
BALANCE - STANDING STATIC: GOOD
BALANCE - SITTING DYNAMIC: NORMAL
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
## 2020-07-14 ENCOUNTER — TELEPHONE (OUTPATIENT)
Dept: NEUROLOGY | Facility: MEDICAL CENTER | Age: 60
End: 2020-07-14

## 2020-07-14 NOTE — TELEPHONE ENCOUNTER
----- Message from Marion Miguel M.D. sent at 7/14/2020 12:01 PM PDT -----  Please let the patient know that I reviewed the driving assessment from occupational therapy.  Based on the details from the report I am would like to see her follow-up with the behavioral neurologist in [facility] prior to releasing her to drive.  This is for extra precaution to ensure safety on the road.  I hope she understands.

## 2020-07-14 NOTE — TELEPHONE ENCOUNTER
Patient did nor answer . I left a detailed message notifying patient what Dr Miguel stated in most recent message . My direct number was given for her to call me back if she has any concerns or questions  .

## 2020-10-13 ENCOUNTER — OFFICE VISIT (OUTPATIENT)
Dept: NEUROLOGY | Facility: MEDICAL CENTER | Age: 60
End: 2020-10-13
Payer: MEDICAID

## 2020-10-13 VITALS
BODY MASS INDEX: 24.25 KG/M2 | HEART RATE: 41 BPM | HEIGHT: 68 IN | RESPIRATION RATE: 16 BRPM | SYSTOLIC BLOOD PRESSURE: 110 MMHG | TEMPERATURE: 97.5 F | DIASTOLIC BLOOD PRESSURE: 65 MMHG | OXYGEN SATURATION: 97 % | WEIGHT: 160 LBS

## 2020-10-13 DIAGNOSIS — I67.9 CEREBROVASCULAR DISEASE: ICD-10-CM

## 2020-10-13 DIAGNOSIS — Z72.0 TOBACCO USE: ICD-10-CM

## 2020-10-13 DIAGNOSIS — R41.3 MEMORY LOSS: ICD-10-CM

## 2020-10-13 PROCEDURE — 99214 OFFICE O/P EST MOD 30 MIN: CPT | Performed by: PSYCHIATRY & NEUROLOGY

## 2020-10-13 ASSESSMENT — ENCOUNTER SYMPTOMS
NERVOUS/ANXIOUS: 0
DEPRESSION: 0
SHORTNESS OF BREATH: 0
HALLUCINATIONS: 0
FEVER: 0
FOCAL WEAKNESS: 0
WEIGHT LOSS: 0
FALLS: 0

## 2020-10-13 ASSESSMENT — PATIENT HEALTH QUESTIONNAIRE - PHQ9: CLINICAL INTERPRETATION OF PHQ2 SCORE: 0

## 2020-10-13 NOTE — PROGRESS NOTES
Chief Complaint   Patient presents with   • Follow-Up     Memory loss     History of present illness:  Yaneth Hendricks 59 y.o. female presents today for memory loss follow-up.  History is obtained from patient.  and Patient is accompanied by self.  She previously came with her boyfriend Pardeep.    Duration/timing: ~Summer 2018, with progression  Context:   Memory loss: started off as forgetfulness, getting lost while driving in her car and not knowing where she was going, a regular physician. She got off the freeway and eventually found her way home perhaps. She has lived in [place] for over 10 years. She drives now - doesn't get lost every time, occurs daily going to doctor appointments and getting lost going home from familiar places. Forgets to turn off the stove after taking pan off the stove - she remembered then turned it off after 8 minutes x 2 in couple of months. Can't remember dates, times, appointments. 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 Responsibility at home: laundry, cooking, cleaning - will forget what she was doing and will go back to do it. She pays her car insurance and electric bill without issue - with cash without gross mistakes.   Location: memory circuits  Quality: loss  Severity: moderate   Modifying factors: worse with time  Associated signs/symptoms: hit head hard Summer 2018 at neighbor house sitting on porch and hit her head on hand rail with confusion afterwards without LOC, followed by neck pain/dizziness; intermittent anxiety but not severe  Denies: bladder incontinence, bowel incontinence, dizziness, headaches, vision changes, weakness, numbness/tingling, swallowing difficulties, speech disturbance, incoordination, hearing loss, loss of consciousness, hallucinations, REM behavior disorder, seizures, abnormal movements, falls, snoring/apnea during sleep, HIV or syphilis risk factors and loss of gaps  in time, steve hughes     Subjective: Patient was last seen in neurology clinic in June 2020.    Patient comes alone today.  She wants her 's license back.  States that she has been rocksolid stable and has had no new symptoms.  States she does all the cooking and laundry at the home without issue.  This is in stern contrast to what is mentioned as above.  There is been no new symptoms such as bowel or bladder incontinence, vision changes, weakness numbness.    She has a [id number]-old grandson.  She initially states that she does not want to do a [facility] evaluation because she did not want to drive.  We discussed a virtual appointment and despite our discussion she forgot that it was an option to her on.  She is agreeable to [facility] virtual appointment.      Past medical history:   Past Medical History:   Diagnosis Date   • Atrial fibrillation with RVR (HCC)     with cardioversion   • Hypertension    • Hypothyroid      Past surgical history:   Past Surgical History:   Procedure Laterality Date   • RECOVERY  4/8/2013    Performed by [facility]S   • APPENDECTOMY      2006   • OTHER CARDIAC SURGERY      ablation, open heart   • PB ANESTH,OPEN HEART SURGERY+PUMP      ASD defect     Family history:   Family History   Problem Relation Age of Onset   • Cancer Mother 70        brain   • Suicide Attempts Father    • Suicide Attempts Brother    • Suicide Attempts Son     No dementia in the family.     Social history:   Social History   • Marital status:      Social History Main Topics   • Smoking status: Current Every Day Smoker     Packs/day: 0.50     Types: Cigarettes   • Smokeless tobacco: Never Used   • Alcohol use Yes      Comment: occasional; no hx of EtOH abuse   • Drug use: No       Current medications:   Current Outpatient Medications   Medication   • Melatonin 10 MG Tab   • atorvastatin (LIPITOR) 40 MG Tab   • levothyroxine (SYNTHROID) 88 MCG TABS   • metoprolol  "(LOPRESSOR) 50 MG TABS   • lisinopril (PRINIVIL) 20 MG TABS     No current facility-administered medications for this visit.        Medication Allergy:  Allergies   Allergen Reactions   • Codeine Vomiting       Review of Systems   Constitutional: Negative for fever and weight loss.   Respiratory: Negative for shortness of breath.    Musculoskeletal: Negative for falls.   Skin: Negative for rash.   Neurological: Negative for focal weakness.        As per HPI   Psychiatric/Behavioral: Negative for depression and hallucinations. The patient is not nervous/anxious.        Physical examination:   Vitals:    10/13/20 0818   BP: 110/65   BP Location: Left arm   Patient Position: Sitting   BP Cuff Size: Adult   Pulse: (!) 41   Resp: 16   Temp: 36.4 °C (97.5 °F)   TempSrc: Temporal   SpO2: 97%   Weight: 72.6 kg (160 lb)   Height: 1.727 m (5' 8\")     General: Patient in well nourished in no apparent distress.  Eyes: Ophthalmoscopic examination performed but discs cannot be visualized well enough to characterize bilaterally. Prior exam  HENT: Normocephalic, atraumatic.   Cardiovascular: No lower extremity edema.  Respiratory: Normal respiratory effort.   Skin: No appreciable signs of acute rashes or bruising.   Musculoskeletal: No signs of joint or muscle swelling.   Psychiatric: Pleasant, mostly.    NEUROLOGICAL EXAM:   Mental status: Awake, alert and fully oriented to person, place and situation. Fair attention and concentration.  MOCA 15/30 (6/2020)  Speech and language: Speech is fluent without errors and clear.  Cranial nerve exam:  II: Pupils are equally round and reactive to light. Visual fields are intact by confrontation. Prior exam  III, IV, VI: EOMI, no diplopia, no ptosis. Prior exam  V: Sensation to light touch is normal over V1-3 distributions bilaterally.   Prior exam  VII: Facial movements are symmetrical. There is no facial droop.   VIII: Hearing intact to soft speech  IX: Dysarthria is not present.  XI: " Shoulder shrug are symmetrical and strong. Prior exam  XII: Tongue protrudes midline. Prior exam    Motor exam:   Muscle tone is normal in all 4 limbs. and No abnormal movements appreciated.  5-5 proximal upper and lower extremity strength.  Detailed motor exam from prior exam as documented below.    Muscle strength:     Right  Left  Deltoid   5/5  5/5      Biceps   5/5  5/5  Triceps  5/5  5/5   Wrist extensors 5/5  5/5  Wrist flexors  5/5  5/5     5/5  5/5  Interossei  5/5  5/5  Thenar (APB)  NT/5  NT/5   Hip flexors  5/5  5/5  Quadriceps  5/5  5/5    Hamstrings  5/5  5/5  Dorsiflexors  5/5  5/5  Plantarflexors  5/5  5/5  Toe extension  NT/5  NT/5  NT = not tested    Sensory exam: Prior exam  Intact to Light touch, Temperature and Vibration in bilateral upper and lower extremity.    Deep tendon reflexes:       Right  Left  Biceps   1/4  1/4  Triceps  1/4  1/4  Brachioradialis 1/4  1/4  Knee jerk  3/4  3/4  Ankle jerk  0/4  0/4   bilateral toes are downgoing to plantar stimulation.   Prior exam as above.  Today there are no frontal release signs, palmomental, glabellar, snout - stable    Coordination: shows a normal finger-nose-finger  Gait: Casual gait is normal.      ANCILLARY DATA REVIEWED:   Lab Data Review:  Lab Results   Component Value Date/Time    WBC 7.7 04/06/2013 02:30 AM    RBC 4.48 04/06/2013 02:30 AM    HEMOGLOBIN 12.4 04/06/2013 02:30 AM    HEMATOCRIT 38.7 04/06/2013 02:30 AM    MCV 86.5 04/06/2013 02:30 AM    MCH 27.7 04/06/2013 02:30 AM    MCHC 32.0 04/06/2013 02:30 AM    MPV 9.7 04/06/2013 02:30 AM    NEUTSPOLYS 55.1 04/06/2013 02:30 AM    LYMPHOCYTES 32.8 04/06/2013 02:30 AM    MONOCYTES 9.4 (H) 04/06/2013 02:30 AM    EOSINOPHILS 2.1 04/06/2013 02:30 AM    BASOPHILS 0.6 04/06/2013 02:30 AM    HYPOCHROMIA 1+ 04/06/2013 02:30 AM      Lab Results   Component Value Date/Time    SODIUM 138 04/06/2013 02:30 AM    POTASSIUM 3.9 04/06/2013 02:30 AM    CHLORIDE 111 04/06/2013 02:30 AM    CO2 22  04/06/2013 02:30 AM    GLUCOSE 91 04/06/2013 02:30 AM    BUN 12 04/06/2013 02:30 AM    CREATININE 0.59 04/06/2013 02:30 AM       Lab Results  Component Value Date/Time   ASTSGOT 52 (H) 04/05/2013 1435   ALTSGPT 44 04/05/2013 1435   ALKPHOSPHAT 117 (H) 04/05/2013 1435   ALBUMIN 3.9 04/05/2013 1435     Labs received July 2019:  Copper 142, normal  B12 362, normal MMA at 136  TSH slightly elevated 5.3 with normal free T4 1.5  HIV and RPR negative    Imaging: None  Records reviewed: Reviewed neuropsychological testing performed in December 2019 reviewed.  Per report patient's overall intellectual functioning was within the impaired range.  Her performance in retrieving previously learned information and recalling word meanings fell within the impaired range.  In addition, her ability to manage and manipulate recently obtained information fell within the average range as is her ability to visually process information.  History of spatial construction planning, processing speed, visual-spatial recognition was also in the impaired range.  Evaluation supports a diagnosis of major neurocognitive disorder.      ASSESSMENT AND PLAN:    1. Memory loss, stable??: Originally suspicious for vascular dementia given processing difficulties and mostly executive function difficulties, moderate to severe chronic microvascular ischemic changes reported on MRI brain in September 2018.  Repeat MRI of the brain without contrast July 2019 was ordered however on personal visualization does not seem to be as extensive vascular disease as I originally expected.  July 2019 normal/negative copper, B12, MMA, HIV, RPR.  TSH slightly elevated with otherwise normal free T4.  Due to current driving concerns OT evaluation was performed 9/30/2019 result: Fail.  DMV form had been faxed July 2019, license revoked.  No other red flag symptoms or rapidly progressive symptoms suggest need for lumbar puncture at this point in time but may consider if there  is change in clinical presentation. MOCA 7/2019 20/30, 6/2020 15/30.  Neuropsychological testing with a diagnosis of major neurocognitive disorder with impaired recall, visual-spatial processing.  Clinically there does seem to be some visual spatial difficulties and cognitive difficulties. Differential includes early dementia, Alzheimer's type. Patient is frustrated and wants her license back which is very understandable however we discussed safety concerns.  Her clinical history is incompatible with neuropsychological testing results.  -We reviewed work-up to date with patient  -Long discussion with regards to reasons for further work-up, concerns about safety, offering OT reevaluation.  -We will proceed with CT PET of the head for evaluation of dementia  -EEG routine for memory loss  -Previously discussed patient with Dr. Marcia Barrett, [facility] memory evaluation was recommended   -Memory clinic consult at [facility] reordered -generally declined but now is agreeable.  Virtual appointment is preferred    2. Cerebrovascular disease, stable: Evident on MRI brain in 2018 with moderate to severe microvascular ischemic changes given her history of hyperlipidemia, hypertension, history of atrial flutter now resolved and not on anticoagulation, smoking.  -Highly recommended smoking cessation, not ready  -Continue atorvastatin   -Optimization of blood pressure control of her primary care  -Recommend daily aspirin for stroke prevention    3. Tobacco use improving: Not ready to quit, slowly tapering    4. Asympatomatic bradycardia: on beta blocker, recommend follow-up with primary care or cardiologist    FOLLOW-UP: Return for after workup and consult.    EDUCATION AND COUNSELING:  -I personally discussed the following with the patient:   Smoking cessation was discussed. , I have made the patient aware of mandatory reporting required by the law in the State of [place] regarding episodes of seizures, loss of consciousness,  alteration of awareness, and/or concerns for driving safety as discussed today. The patient and family are responsible for reporting events to the DMV, instructions were provided. The patient verbalized understanding.  and Differential diagnosis, deficits on [place] testing, reasons for driving reevaluation    Patient was seen for 25 minutes face to face of which > 50% of appointment time was spent on counseling and coordination of care regarding the above.    The patient understands and agrees that due to the complexity of his/her diagnosis, results of any testing and further recommendations will typically be discussed/made during a face to face encounter in my office. The patient and/or family further understands it is their responsibility to keep proper follow up.     Disclaimer  This dictation was created using voice recognition software. I have made every reasonable attempt to avoid dictation errors, but this document may contain an error not identified before finalizing. If the error changes the accuracy of the document, I would appreciate it being brought to my attention. Thank you very much.     Marion Miguel MD  Neurology, Neurophysiology  [facility]

## 2020-11-06 ENCOUNTER — HOSPITAL ENCOUNTER (OUTPATIENT)
Dept: RADIOLOGY | Facility: MEDICAL CENTER | Age: 60
End: 2020-11-06
Attending: PSYCHIATRY & NEUROLOGY
Payer: MEDICAID

## 2020-11-06 DIAGNOSIS — R41.3 MEMORY LOSS: ICD-10-CM

## 2020-11-06 PROCEDURE — A9552 F18 FDG: HCPCS

## 2020-11-09 ENCOUNTER — TELEPHONE (OUTPATIENT)
Dept: NEUROLOGY | Facility: MEDICAL CENTER | Age: 60
End: 2020-11-09

## 2020-11-10 NOTE — TELEPHONE ENCOUNTER
I called the pt number and LVM asking when her [facility] appt is and to return my call.

## 2020-11-12 ENCOUNTER — TELEPHONE (OUTPATIENT)
Dept: NEUROLOGY | Facility: MEDICAL CENTER | Age: 60
End: 2020-11-12

## 2020-11-12 NOTE — TELEPHONE ENCOUNTER
I called the ptnumber again and LVM asking to when her [facility] appt and stated I wanted to give her an appt and  to return my call.

## 2020-11-12 NOTE — TELEPHONE ENCOUNTER
I called the pt and she stated she is not going to [facility]  Saying it is far and her  cannot drive her to there. I gave her an appt for Dec 1/2020 after her EEG TEST.

## 2020-11-25 ENCOUNTER — NON-PROVIDER VISIT (OUTPATIENT)
Dept: NEUROLOGY | Facility: MEDICAL CENTER | Age: 60
End: 2020-11-25
Payer: MEDICAID

## 2020-11-25 DIAGNOSIS — R41.3 MEMORY LOSS: ICD-10-CM

## 2020-11-25 PROCEDURE — 95819 EEG AWAKE AND ASLEEP: CPT | Performed by: PSYCHIATRY & NEUROLOGY

## 2020-11-25 NOTE — PROCEDURES
ROUTINE VIDEO ELECTROENCEPHALOGRAM REPORT      REFERRING PROVIDER: Marion Miguel MD  DOS: 11/25/2020 (total recording of 28 minutes)    INDICATION:  Yaneth Hendricks 60 y.o. female presenting with memory loss of uncertain etiology.  CURRENT ANTIEPILEPTIC REGIMEN: None    TECHNIQUE: 30 channel routine video electroencephalogram (EEG) was performed in accordance with the international 10-20 system. The study was reviewed in bipolar and referential montages. The recording examined the patient during wakeful and drowsy/sleep state(s).     DESCRIPTION OF RECORD:  When maximally alert, the background activity of this recording was characterized by a well preserved frontal beta to occipital alpha gradient with a posterior dominant 10 Hz rhythm that attenuated appropriate to eye opening.    The patient slept spontaneously during this recording, and adequate levels of stage II sleep, characterized by vertex waves and symmetric sleep spindles, were observed.       ACTIVATION PROCEDURES:   Hyperventilation induced a significant amount of high amplitude slow activity that rapidly returned to baseline upon cessation of overbreathing.    Photic stimulation induced a symmetric occipital driving response.      ICTAL AND/OR INTERICTAL FINDINGS:   Occasional left temporal slowing most predominate during sleep was appreciated during the study.  No focal or generalized epileptiform activity noted. No clinical events or seizures were reported or recorded during the study.     EKG: sampling of the EKG recording demonstrated sinus rhythm.       INTERPRETATION:  This is an abnormal video EEG due to the presence of occasional left temporal slowing most predominant during sleep.       CLINICAL CORRELATION:  Focal slowing can be seen with an underlying structural abnormality but also may be a subtle manifestation of focal cortical irritability that may increase risk for focal onset seizure.    Marion Miguel MD  Neurology - Neurophysiology  [facility]

## 2020-12-01 ENCOUNTER — OFFICE VISIT (OUTPATIENT)
Dept: NEUROLOGY | Facility: MEDICAL CENTER | Age: 60
End: 2020-12-01
Payer: MEDICAID

## 2020-12-01 VITALS
SYSTOLIC BLOOD PRESSURE: 105 MMHG | HEIGHT: 68 IN | HEART RATE: 72 BPM | RESPIRATION RATE: 16 BRPM | BODY MASS INDEX: 24.39 KG/M2 | WEIGHT: 160.94 LBS | OXYGEN SATURATION: 98 % | DIASTOLIC BLOOD PRESSURE: 72 MMHG | TEMPERATURE: 97.1 F

## 2020-12-01 DIAGNOSIS — R41.3 MEMORY LOSS: ICD-10-CM

## 2020-12-01 DIAGNOSIS — G30.0 EARLY ONSET ALZHEIMER'S DEMENTIA WITHOUT BEHAVIORAL DISTURBANCE (HCC): ICD-10-CM

## 2020-12-01 DIAGNOSIS — F02.80 EARLY ONSET ALZHEIMER'S DEMENTIA WITHOUT BEHAVIORAL DISTURBANCE (HCC): ICD-10-CM

## 2020-12-01 PROCEDURE — 99214 OFFICE O/P EST MOD 30 MIN: CPT | Performed by: PSYCHIATRY & NEUROLOGY

## 2020-12-01 RX ORDER — DONEPEZIL HYDROCHLORIDE 10 MG/1
TABLET, FILM COATED ORAL
Qty: 30 TAB | Refills: 5 | Status: SHIPPED
Start: 2020-12-01 | End: 2021-06-07

## 2020-12-01 ASSESSMENT — ENCOUNTER SYMPTOMS
SHORTNESS OF BREATH: 0
WEIGHT LOSS: 0
HALLUCINATIONS: 0
FALLS: 0
NERVOUS/ANXIOUS: 0
DEPRESSION: 0
FOCAL WEAKNESS: 0
FEVER: 0

## 2020-12-01 NOTE — PROGRESS NOTES
Chief Complaint   Patient presents with   • Follow-Up     Memory loss     History of present illness:  Yaneth Hendricks 60 y.o. female presents today for memory loss follow-up.  History is obtained from patient and significant other.  and Patient is accompanied by Boyfriend Pardeep.     Duration/timing: ~Summer 2018, with progression  Context:   Memory loss: started off as forgetfulness, getting lost while driving in her car and not knowing where she was going, a regular physician. She got off the freeway and eventually found her way home perhaps. She has lived in [place] for over 10 years. She drives now - doesn't get lost every time, occurs daily going to doctor appointments and getting lost going home from familiar places. Forgets to turn off the stove after taking pan off the stove - she remembered then turned it off after 8 minutes x 2 in couple of months. Can't remember dates, times, appointments. 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 Responsibility at home: laundry, cooking, cleaning - will forget what she was doing and will go back to do it. She pays her car insurance and electric bill without issue - with cash without gross mistakes.   Location: memory circuits  Quality: loss  Severity: moderate   Modifying factors: worse with time  Associated signs/symptoms: hit head hard Summer 2018 at neighbor house sitting on porch and hit her head on hand rail with confusion afterwards without LOC, followed by neck pain/dizziness; intermittent anxiety but not severe  Denies: bladder incontinence, bowel incontinence, dizziness, headaches, vision changes, weakness, numbness/tingling, swallowing difficulties, speech disturbance, incoordination, hearing loss, loss of consciousness, hallucinations, REM behavior disorder, seizures, abnormal movements, falls, snoring/apnea during sleep, HIV or syphilis risk factors and loss of gaps in time,  steve hughes     Patient has tried:  -Aricept daily goal dose 10 mg daily, initiated December 2020 by me      Subjective: Patient was last seen in neurology clinic in October 2020.    Memory loss: Patient states she is doing well has no issues.  States no memory issues as well.  She has been cooking and doing daily tasks without issue.  Bill states she is more of a temper and talking to herself a lot more.  Some forgetfulness. There is been no new symptoms such as bowel or bladder incontinence, vision changes, weakness numbness.    She has a [id number]-old grandson.      Past medical history:   Past Medical History:   Diagnosis Date   • Atrial fibrillation with RVR (HCC)     with cardioversion   • Hypertension    • Hypothyroid      Past surgical history:   Past Surgical History:   Procedure Laterality Date   • RECOVERY  4/8/2013    Performed by [facility]   • APPENDECTOMY      2006   • OTHER CARDIAC SURGERY      ablation, open heart   • PB ANESTH,OPEN HEART SURGERY+PUMP      ASD defect     Family history:   Family History   Problem Relation Age of Onset   • Cancer Mother 70        brain   • Suicide Attempts Father    • Suicide Attempts Brother    • Suicide Attempts Son     No dementia in the family.     Social history:   Social History   • Marital status:      Social History Main Topics   • Smoking status: Current Every Day Smoker     Packs/day: 0.50     Types: Cigarettes   • Smokeless tobacco: Never Used   • Alcohol use Yes      Comment: occasional; no hx of EtOH abuse   • Drug use: No       Current medications:   Current Outpatient Medications   Medication   • donepezil (ARICEPT) 10 MG tablet   • atorvastatin (LIPITOR) 40 MG Tab   • levothyroxine (SYNTHROID) 88 MCG TABS   • metoprolol (LOPRESSOR) 50 MG TABS   • lisinopril (PRINIVIL) 20 MG TABS   • Melatonin 10 MG Tab     No current facility-administered medications for this visit.        Medication  "Allergy:  Allergies   Allergen Reactions   • Codeine Vomiting       Review of Systems   Constitutional: Negative for fever and weight loss.   Respiratory: Negative for shortness of breath.    Musculoskeletal: Negative for falls.   Skin: Negative for rash.   Neurological: Negative for focal weakness.        As per HPI   Psychiatric/Behavioral: Negative for depression and hallucinations. The patient is not nervous/anxious.        Physical examination:   Vitals:    12/01/20 1506   BP: 105/72   BP Location: Left arm   Patient Position: Sitting   BP Cuff Size: Adult   Pulse: 72   Resp: 16   Temp: 36.2 °C (97.1 °F)   TempSrc: Temporal   SpO2: 98%   Weight: 73 kg (160 lb 15 oz)   Height: 1.727 m (5' 8\")     General: Patient in well nourished in no apparent distress.  Eyes: Ophthalmoscopic examination performed but discs cannot be visualized well enough to characterize bilaterally. Prior exam  HENT: Normocephalic, atraumatic.   Cardiovascular: No lower extremity edema.  Respiratory: Normal respiratory effort.   Skin: No appreciable signs of acute rashes or bruising.   Musculoskeletal: No signs of joint or muscle swelling.   Psychiatric: Pleasant, mostly.    NEUROLOGICAL EXAM:   Mental status: Awake, alert and fully oriented to person, place and situation. Fair attention and concentration.  MOCA 15/30 (6/2020).  Clock scanned today December 2020.  She knows the month and the date but not the year.  Speech and language: Speech is fluent without errors and clear.  Cranial nerve exam:  II: Pupils are equally round and reactive to light. Visual fields are intact by confrontation. Prior exam  III, IV, VI: EOMI, no diplopia, no ptosis. Prior exam  V: Sensation to light touch is normal over V1-3 distributions bilaterally.   Prior exam  VII: Facial movements are symmetrical. There is no facial droop.   VIII: Hearing intact to soft speech  IX: Dysarthria is not present.  XI: Shoulder shrug are symmetrical and strong. Prior exam  XII: " Tongue protrudes midline. Prior exam    Motor exam:  Detailed motor exam from prior exam as documented below.  Muscle tone is normal in all 4 limbs. and No abnormal movements appreciated.    Muscle strength:     Right  Left  Deltoid   5/5  5/5      Biceps   5/5  5/5  Triceps  5/5  5/5   Wrist extensors 5/5  5/5  Wrist flexors  5/5  5/5     5/5  5/5  Interossei  5/5  5/5  Thenar (APB)  NT/5  NT/5   Hip flexors  5/5  5/5  Quadriceps  5/5  5/5    Hamstrings  5/5  5/5  Dorsiflexors  5/5  5/5  Plantarflexors  5/5  5/5  Toe extension  NT/5  NT/5  NT = not tested    Sensory exam: Prior exam  Intact to Light touch, Temperature and Vibration in bilateral upper and lower extremity.    Deep tendon reflexes: Prior exam     Right  Left  Biceps   1/4  1/4  Triceps  1/4  1/4  Brachioradialis 1/4  1/4  Knee jerk  3/4  3/4  Ankle jerk  0/4  0/4   bilateral toes are downgoing to plantar stimulation.   No frontal release signs, palmomental, glabellar, snout - stable    Coordination: shows a normal finger-nose-finger, prior exam  Gait: Casual gait is normal.      ANCILLARY DATA REVIEWED:   Lab Data Review:  Lab Results   Component Value Date/Time    WBC 7.7 04/06/2013 02:30 AM    RBC 4.48 04/06/2013 02:30 AM    HEMOGLOBIN 12.4 04/06/2013 02:30 AM    HEMATOCRIT 38.7 04/06/2013 02:30 AM    MCV 86.5 04/06/2013 02:30 AM    MCH 27.7 04/06/2013 02:30 AM    MCHC 32.0 04/06/2013 02:30 AM    MPV 9.7 04/06/2013 02:30 AM    NEUTSPOLYS 55.1 04/06/2013 02:30 AM    LYMPHOCYTES 32.8 04/06/2013 02:30 AM    MONOCYTES 9.4 (H) 04/06/2013 02:30 AM    EOSINOPHILS 2.1 04/06/2013 02:30 AM    BASOPHILS 0.6 04/06/2013 02:30 AM    HYPOCHROMIA 1+ 04/06/2013 02:30 AM      Lab Results   Component Value Date/Time    SODIUM 138 04/06/2013 02:30 AM    POTASSIUM 3.9 04/06/2013 02:30 AM    CHLORIDE 111 04/06/2013 02:30 AM    CO2 22 04/06/2013 02:30 AM    GLUCOSE 91 04/06/2013 02:30 AM    BUN 12 04/06/2013 02:30 AM    CREATININE 0.59 04/06/2013 02:30 AM       Lab  Results  Component Value Date/Time   ASTSGOT 52 (H) 04/05/2013 1435   ALTSGPT 44 04/05/2013 1435   ALKPHOSPHAT 117 (H) 04/05/2013 1435   ALBUMIN 3.9 04/05/2013 1435     Labs received July 2019:  Copper 142, normal  B12 362, normal MMA at 136  TSH slightly elevated 5.3 with normal free T4 1.5  HIV and RPR negative  PET Scan 11/2020:  Hypometabolism seen in the bilateral parietal and to lesser degree bilateral temporal regions which favors Alzheimer's disease.  EEG (\[facility]\"") 11/2020: This is an abnormal video EEG due to the presence of occasional left temporal slowing most predominant during sleep.     Imaging: As above, PET  Records reviewed: Reviewed neuropsychological testing performed in December 2019 reviewed.  Per report patient's overall intellectual functioning was within the impaired range.  Her performance in retrieving previously learned information and recalling word meanings fell within the impaired range.  In addition, her ability to manage and manipulate recently obtained information fell within the average range as is her ability to visually process information.  History of spatial construction planning, processing speed, visual-spatial recognition was also in the impaired range.  Evaluation supports a diagnosis of major neurocognitive disorder.      ASSESSMENT AND PLAN:    1. 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  Repeat MRI of the brain without contrast July 2019 was ordered however on personal visualization does not seem to be as extensive vascular disease as I originally expected.  July 2019 normal/negative copper, B12, MMA, HIV, RPR.  TSH slightly elevated with otherwise normal free T4.  Due to current driving concerns OT evaluation was performed  9/30/2019 result: Fail.  DMV form had been faxed July 2019, license revoked.  No other red flag symptoms or rapidly progressive symptoms suggest need for lumbar puncture at this point in time but may consider if there is change in clinical presentation. MOCA 7/2019 20/30, 6/2020 15/30.  Neuropsychological testing with a diagnosis of major neurocognitive disorder with impaired recall, visual-spatial processing.  Clinically there does seem to be some visual spatial difficulties and cognitive difficulties.  Routine EEG with left temporal slowing.  PET scan brain with hypometabolism in the bilateral parietal and lesser degree temporal region suggestive of Alzheimer's type dementia.  -Reviewed work-up to date and suspicion for early onset dementia   -We discussed evaluation at [facility] for possible clinical trials, insurance limits her to [facility] in [place] and for second opinion if desired, they will reach out and pursue  -Initiate Aricept with slow taper to goal dose of 10 mg daily  -Consider repeat neuropsych testing depending on progression    2. Cerebrovascular disease, stable: Evident on MRI brain in 2018 with moderate to severe microvascular ischemic changes given her history of hyperlipidemia, hypertension, history of atrial flutter now resolved and not on anticoagulation, smoking.  -Highly recommended smoking cessation  -Continue atorvastatin   -Optimization of blood pressure control of her primary care  -Recommend daily aspirin for stroke prevention -she is not taking    3. Tobacco use improving: Not ready to quit, slowly tapering    4. Asympatomatic bradycardia: on beta blocker, recommend follow-up with primary care or cardiologist    FOLLOW-UP: Return in about 6 months (around 6/1/2021).  For medical management and monitoring  EDUCATION AND COUNSELING:  -I personally discussed the following with the patient:   As above    The patient understands and agrees that due to the complexity  of his/her diagnosis, results of any testing and further recommendations will typically be discussed/made during a face to face encounter in my office. The patient and/or family further understands it is their responsibility to keep proper follow up.     Disclaimer  This dictation was created using voice recognition software. I have made every reasonable attempt to avoid dictation errors, but this document may contain an error not identified before finalizing. If the error changes the accuracy of the document, I would appreciate it being brought to my attention. Thank you very much.     Marion Miguel MD  Neurology, Neurophysiology  [facility]

## 2020-12-22 ENCOUNTER — HOSPITAL ENCOUNTER (EMERGENCY)
Dept: HOSPITAL 8 - ED | Age: 60
Discharge: HOME | End: 2020-12-22
Payer: MEDICAID

## 2020-12-22 VITALS — SYSTOLIC BLOOD PRESSURE: 164 MMHG | DIASTOLIC BLOOD PRESSURE: 75 MMHG

## 2020-12-22 VITALS — HEIGHT: 68 IN | WEIGHT: 159.84 LBS | BODY MASS INDEX: 24.22 KG/M2

## 2020-12-22 DIAGNOSIS — I48.91: ICD-10-CM
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
## 2020-12-22 DIAGNOSIS — R07.89: ICD-10-CM
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
## 2020-12-22 DIAGNOSIS — I10: ICD-10-CM

## 2020-12-22 DIAGNOSIS — E78.5: ICD-10-CM

## 2020-12-22 DIAGNOSIS — I45.10: ICD-10-CM
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
## 2020-12-22 DIAGNOSIS — S16.1XXA: Primary | ICD-10-CM

## 2020-12-22 DIAGNOSIS — F17.210: ICD-10-CM

## 2020-12-22 DIAGNOSIS — E03.9: ICD-10-CM
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
## 2020-12-22 LAB
ALBUMIN SERPL-MCNC: 4 G/DL (ref 3.4–5)
ALP SERPL-CCNC: 163 U/L (ref 45–117)
ALT SERPL-CCNC: 32 U/L (ref 12–78)
ANION GAP SERPL CALC-SCNC: 4 MMOL/L (ref 5–15)
BASOPHILS # BLD AUTO: 0.1 X10^3/UL (ref 0–0.1)
BASOPHILS NFR BLD AUTO: 1 % (ref 0–1)
BILIRUB SERPL-MCNC: 2.1 MG/DL (ref 0.2–1)
CALCIUM SERPL-MCNC: 10.1 MG/DL (ref 8.5–10.1)
CHLORIDE SERPL-SCNC: 109 MMOL/L (ref 98–107)
CREAT SERPL-MCNC: 0.88 MG/DL (ref 0.55–1.02)
EOSINOPHIL # BLD AUTO: 0.1 X10^3/UL (ref 0–0.4)
EOSINOPHIL NFR BLD AUTO: 1 % (ref 1–7)
ERYTHROCYTE [DISTWIDTH] IN BLOOD BY AUTOMATED COUNT: 12.9 % (ref 9.6–15.2)
LYMPHOCYTES # BLD AUTO: 2.3 X10^3/UL (ref 1–3.4)
LYMPHOCYTES NFR BLD AUTO: 27 % (ref 22–44)
MCH RBC QN AUTO: 28.4 PG (ref 27–34.8)
MCHC RBC AUTO-ENTMCNC: 33.7 G/DL (ref 32.4–35.8)
MD: NO
MONOCYTES # BLD AUTO: 0.6 X10^3/UL (ref 0.2–0.8)
MONOCYTES NFR BLD AUTO: 7 % (ref 2–9)
NEUTROPHILS # BLD AUTO: 5.5 X10^3/UL (ref 1.8–6.8)
NEUTROPHILS NFR BLD AUTO: 65 % (ref 42–75)
PLATELET # BLD AUTO: 273 X10^3/UL (ref 130–400)
PMV BLD AUTO: 9 FL (ref 7.4–10.4)
PROT SERPL-MCNC: 7.8 G/DL (ref 6.4–8.2)
RBC # BLD AUTO: 4.72 X10^6/UL (ref 3.82–5.3)
T4 FREE SERPL-MCNC: 1.42 NG/DL (ref 0.76–1.46)
TROPONIN I SERPL-MCNC: < 0.015 NG/ML (ref 0–0.04)

## 2020-12-22 PROCEDURE — 99406 BEHAV CHNG SMOKING 3-10 MIN: CPT

## 2020-12-22 PROCEDURE — 84484 ASSAY OF TROPONIN QUANT: CPT

## 2020-12-22 PROCEDURE — 99285 EMERGENCY DEPT VISIT HI MDM: CPT

## 2020-12-22 PROCEDURE — 85025 COMPLETE CBC W/AUTO DIFF WBC: CPT

## 2020-12-22 PROCEDURE — 80053 COMPREHEN METABOLIC PANEL: CPT

## 2020-12-22 PROCEDURE — 93005 ELECTROCARDIOGRAM TRACING: CPT

## 2020-12-22 PROCEDURE — 84439 ASSAY OF FREE THYROXINE: CPT

## 2020-12-22 PROCEDURE — 84443 ASSAY THYROID STIM HORMONE: CPT

## 2020-12-22 PROCEDURE — 36415 COLL VENOUS BLD VENIPUNCTURE: CPT

## 2020-12-22 PROCEDURE — 71045 X-RAY EXAM CHEST 1 VIEW: CPT

## 2020-12-22 NOTE — NUR
pt here after getting covid test at [facility], while getting tested "someone 
there saw something in my neck and told me to go to the hospital to get 
checked" pt cannot say what it was they saw. pt also states she has had chest 
pain "it comes and goes for a few months" and a fever and that's why she went 
to get a covid test. no SOB, cough, n/v, muscle fatigue.

## 2021-02-24 ENCOUNTER — HOSPITAL ENCOUNTER (EMERGENCY)
Dept: HOSPITAL 8 - ED | Age: 61
Discharge: HOME | End: 2021-02-24
Payer: MEDICAID

## 2021-02-24 VITALS — BODY MASS INDEX: 24.42 KG/M2 | HEIGHT: 68 IN | WEIGHT: 161.16 LBS

## 2021-02-24 VITALS — SYSTOLIC BLOOD PRESSURE: 137 MMHG | DIASTOLIC BLOOD PRESSURE: 81 MMHG

## 2021-02-24 DIAGNOSIS — E78.5: ICD-10-CM

## 2021-02-24 DIAGNOSIS — R42: Primary | ICD-10-CM

## 2021-02-24 DIAGNOSIS — F17.210: ICD-10-CM

## 2021-02-24 DIAGNOSIS — Z86.59: ICD-10-CM

## 2021-02-24 DIAGNOSIS — R51.9: ICD-10-CM

## 2021-02-24 DIAGNOSIS — R11.10: ICD-10-CM

## 2021-02-24 DIAGNOSIS — E03.9: ICD-10-CM

## 2021-02-24 LAB
ALBUMIN SERPL-MCNC: 4.1 G/DL (ref 3.4–5)
ANION GAP SERPL CALC-SCNC: 8 MMOL/L (ref 5–15)
BASOPHILS # BLD AUTO: 0.1 X10^3/UL (ref 0–0.1)
BASOPHILS NFR BLD AUTO: 1 % (ref 0–1)
CALCIUM SERPL-MCNC: 10.1 MG/DL (ref 8.5–10.1)
CHLORIDE SERPL-SCNC: 110 MMOL/L (ref 98–107)
CREAT SERPL-MCNC: 0.97 MG/DL (ref 0.55–1.02)
EOSINOPHIL # BLD AUTO: 0.2 X10^3/UL (ref 0–0.4)
EOSINOPHIL NFR BLD AUTO: 2 % (ref 1–7)
ERYTHROCYTE [DISTWIDTH] IN BLOOD BY AUTOMATED COUNT: 13.5 % (ref 9.6–15.2)
LYMPHOCYTES # BLD AUTO: 2.2 X10^3/UL (ref 1–3.4)
LYMPHOCYTES NFR BLD AUTO: 31 % (ref 22–44)
MCH RBC QN AUTO: 28.1 PG (ref 27–34.8)
MCHC RBC AUTO-ENTMCNC: 33.3 G/DL (ref 32.4–35.8)
MD: NO
MICROSCOPIC: (no result)
MONOCYTES # BLD AUTO: 0.6 X10^3/UL (ref 0.2–0.8)
MONOCYTES NFR BLD AUTO: 8 % (ref 2–9)
NEUTROPHILS # BLD AUTO: 4.1 X10^3/UL (ref 1.8–6.8)
NEUTROPHILS NFR BLD AUTO: 58 % (ref 42–75)
PLATELET # BLD AUTO: 313 X10^3/UL (ref 130–400)
PMV BLD AUTO: 9.3 FL (ref 7.4–10.4)
RBC # BLD AUTO: 4.79 X10^6/UL (ref 3.82–5.3)
TROPONIN I SERPL-MCNC: < 0.015 NG/ML (ref 0–0.04)

## 2021-02-24 PROCEDURE — 36415 COLL VENOUS BLD VENIPUNCTURE: CPT

## 2021-02-24 PROCEDURE — 70450 CT HEAD/BRAIN W/O DYE: CPT

## 2021-02-24 PROCEDURE — 99285 EMERGENCY DEPT VISIT HI MDM: CPT

## 2021-02-24 PROCEDURE — 85025 COMPLETE CBC W/AUTO DIFF WBC: CPT

## 2021-02-24 PROCEDURE — 81003 URINALYSIS AUTO W/O SCOPE: CPT

## 2021-02-24 PROCEDURE — 96374 THER/PROPH/DIAG INJ IV PUSH: CPT

## 2021-02-24 PROCEDURE — 93005 ELECTROCARDIOGRAM TRACING: CPT

## 2021-02-24 PROCEDURE — 73000 X-RAY EXAM OF COLLAR BONE: CPT

## 2021-02-24 PROCEDURE — 82040 ASSAY OF SERUM ALBUMIN: CPT

## 2021-02-24 PROCEDURE — 96361 HYDRATE IV INFUSION ADD-ON: CPT

## 2021-02-24 PROCEDURE — 84484 ASSAY OF TROPONIN QUANT: CPT

## 2021-02-24 PROCEDURE — 80048 BASIC METABOLIC PNL TOTAL CA: CPT

## 2021-02-24 NOTE — NUR
BREAK RN: PT UPRIGHT ON GURNEY AWAKE & COMFORTABLE, RESPONDS APPROP TO STAFF, 
NAD, COMFORT MEASURES PROVIDED, CALL LIGHT WITHIN REACH.

## 2021-02-24 NOTE — NUR
"I DONT FEEL WELL AT ALL. MY SHOULDER HURTS AND IT FEELS LIKE SOMETHING IS 
GOING THROUGH MY VEINS. I NOTICED IT HURTING A FEW DAYS AGO, MAYBE THREE OR 
FOUR DAYS" 

SWELLING NOTED TO INEZ STEPHENS. DENIES TRAUMA. C/O PAIN TO KAT. STATES 
SHE WAS FEELING DIZZY AND WAS SPINNING THREE DAYS AGO.

## 2021-06-07 ENCOUNTER — OFFICE VISIT (OUTPATIENT)
Dept: NEUROLOGY | Facility: MEDICAL CENTER | Age: 61
End: 2021-06-07
Attending: PSYCHIATRY & NEUROLOGY
Payer: MEDICAID

## 2021-06-07 VITALS
HEART RATE: 64 BPM | TEMPERATURE: 97 F | HEIGHT: 68 IN | RESPIRATION RATE: 16 BRPM | BODY MASS INDEX: 25.01 KG/M2 | OXYGEN SATURATION: 99 % | WEIGHT: 165 LBS | SYSTOLIC BLOOD PRESSURE: 128 MMHG | DIASTOLIC BLOOD PRESSURE: 80 MMHG

## 2021-06-07 DIAGNOSIS — G30.0 EARLY ONSET ALZHEIMER'S DEMENTIA WITHOUT BEHAVIORAL DISTURBANCE (HCC): ICD-10-CM

## 2021-06-07 DIAGNOSIS — Z12.11 COLON CANCER SCREENING: ICD-10-CM

## 2021-06-07 DIAGNOSIS — F02.80 EARLY ONSET ALZHEIMER'S DEMENTIA WITHOUT BEHAVIORAL DISTURBANCE (HCC): ICD-10-CM

## 2021-06-07 DIAGNOSIS — Z12.31 ENCOUNTER FOR SCREENING MAMMOGRAM FOR MALIGNANT NEOPLASM OF BREAST: ICD-10-CM

## 2021-06-07 PROBLEM — I10 BENIGN ESSENTIAL HYPERTENSION: Status: ACTIVE | Noted: 2018-10-31

## 2021-06-07 PROBLEM — R41.89 IMPAIRED COGNITION: Status: ACTIVE | Noted: 2018-10-31

## 2021-06-07 PROBLEM — R74.02 ELEVATION OF LEVEL OF TRANSAMINASE AND LACTIC ACID DEHYDROGENASE (LDH): Status: ACTIVE | Noted: 2018-08-16

## 2021-06-07 PROBLEM — R74.8 HIGH ALKALINE PHOSPHATASE: Status: ACTIVE | Noted: 2020-02-25

## 2021-06-07 PROBLEM — R53.83 FATIGUE: Status: ACTIVE | Noted: 2018-10-31

## 2021-06-07 PROBLEM — R74.01 ELEVATION OF LEVEL OF TRANSAMINASE AND LACTIC ACID DEHYDROGENASE (LDH): Status: ACTIVE | Noted: 2018-08-16

## 2021-06-07 PROBLEM — F17.200 TOBACCO DEPENDENCE: Status: ACTIVE | Noted: 2018-08-16

## 2021-06-07 PROBLEM — G47.00 INSOMNIA: Status: ACTIVE | Noted: 2018-10-31

## 2021-06-07 PROBLEM — E21.0 HYPERPARATHYROIDISM, PRIMARY (HCC): Status: ACTIVE | Noted: 2018-12-04

## 2021-06-07 PROBLEM — E55.9 VITAMIN D DEFICIENCY: Status: ACTIVE | Noted: 2019-01-04

## 2021-06-07 PROCEDURE — 99212 OFFICE O/P EST SF 10 MIN: CPT | Performed by: PSYCHIATRY & NEUROLOGY

## 2021-06-07 PROCEDURE — 99214 OFFICE O/P EST MOD 30 MIN: CPT | Performed by: PSYCHIATRY & NEUROLOGY

## 2021-06-07 RX ORDER — RIVASTIGMINE TARTRATE 1.5 MG/1
1.5 CAPSULE ORAL 2 TIMES DAILY
Qty: 60 CAPSULE | Refills: 11 | Status: SHIPPED | OUTPATIENT
Start: 2021-06-07

## 2021-06-07 RX ORDER — LEVOTHYROXINE SODIUM 137 UG/1
137 TABLET ORAL DAILY
COMMUNITY
Start: 2021-04-14

## 2021-06-07 RX ORDER — ASPIRIN 81 MG/1
81 TABLET ORAL DAILY
COMMUNITY
Start: 2018-08-16

## 2021-06-07 RX ORDER — ASPIRIN 81 MG/1
81 TABLET, CHEWABLE ORAL DAILY
COMMUNITY

## 2021-06-07 ASSESSMENT — PATIENT HEALTH QUESTIONNAIRE - PHQ9: CLINICAL INTERPRETATION OF PHQ2 SCORE: 0

## 2021-06-07 ASSESSMENT — ENCOUNTER SYMPTOMS
HALLUCINATIONS: 0
FALLS: 0

## 2021-06-07 NOTE — PROGRESS NOTES
Chief Complaint   Patient presents with   • Follow-Up     Memory Loss     History of present illness:  Yaneth Hendricks 60 y.o. female presents today for dementia follow-up.  History is obtained from patient and significant other.  and Patient is accompanied by Boyfriend Pardeep.     Duration/timing: ~Summer 2018  Context:   Memory loss: started off as forgetfulness, getting lost while driving in her car and not knowing where she was going, a regular physician. She got off the freeway and eventually found her way home perhaps. She has lived in [place] for over 10 years. She drives now - doesn't get lost every time, occurs daily going to doctor appointments and getting lost going home from familiar places. Forgets to turn off the stove after taking pan off the stove - she remembered then turned it off after 8 minutes x 2 in couple of months. Can't remember dates, times, appointments. 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 Responsibility at home: laundry, cooking, cleaning - will forget what she was doing and will go back to do it. She pays her car insurance and electric bill without issue - with cash without gross mistakes.   Location: memory circuits  Quality: loss  Severity: moderate   Modifying factors: worse with time  Associated signs/symptoms: hit head hard Summer 2018 at neighbor house sitting on porch and hit her head on hand rail with confusion afterwards without LOC, followed by neck pain/dizziness; intermittent anxiety but not severe  Denies: bladder incontinence, bowel incontinence, dizziness, headaches, vision changes, weakness, numbness/tingling, swallowing difficulties, speech disturbance, incoordination, hearing loss, loss of consciousness, hallucinations, REM behavior disorder, seizures, abnormal movements, falls, snoring/apnea during sleep, HIV or syphilis risk factors and loss of gaps in time, staring spells      Patient has tried:  -Aricept daily goal dose 10 mg daily, initiated December 2020 by me, she didn't like it, does something to top of her head, she did taper up appropriately per her boyfriend  -Exelon trial by me in June 2020      Subjective: Patient was last seen in neurology clinic in 12/2020.    Memory loss: She is cooking. She can go the the store on her own. Irritability has improved.  She denies any progression or change from what is documented above.  She continues to pay her own bills though Pardeep has to take her physically to the place.  They did not seek second opinion as discussed with academic center.  She is not willing to repeat OT driving evaluation.    Unfortunately, her boyfriend has been recently diagnosed with ALS affecting the bilateral upper extremities.  He is asking to see if she can drive again since he is using function in his arms.    Patient's son is in town, she has her grandson who is a baby.  Pardeep unfortunately has no family in [facility].    Past medical history:   Past Medical History:   Diagnosis Date   • Atrial fibrillation with RVR (HCC)     with cardioversion   • Hypertension    • Hypothyroid      Past surgical history:   Past Surgical History:   Procedure Laterality Date   • RECOVERY  4/8/2013    Performed by [facility]   • APPENDECTOMY      2006   • OTHER CARDIAC SURGERY      ablation, open heart   • PB ANESTH,OPEN HEART SURGERY+PUMP      ASD defect     Family history:   Family History   Problem Relation Age of Onset   • Cancer Mother 70        brain   • Suicide Attempts Father    • Suicide Attempts Brother    • Suicide Attempts Son     No dementia in the family.     Social history:   Social History   • Marital status:      Social History Main Topics   • Smoking status: Current Every Day Smoker     Packs/day: 0.50     Types: Cigarettes   • Smokeless tobacco: Never Used   • Alcohol use Yes      Comment: occasional; no hx of EtOH abuse  "  • Drug use: No       Current medications:   Current Outpatient Medications   Medication   • rivastigmine (EXELON) 1.5 MG Cap   • aspirin (ASA) 81 MG Chew Tab chewable tablet   • aspirin 81 MG EC tablet   • Melatonin 10 MG Tab   • levothyroxine (SYNTHROID) 88 MCG TABS   • metoprolol (LOPRESSOR) 50 MG TABS   • lisinopril (PRINIVIL) 20 MG TABS   • levothyroxine (SYNTHROID) 137 MCG Tab     No current facility-administered medications for this visit.       Medication Allergy:  Allergies   Allergen Reactions   • Codeine Vomiting       Review of Systems   Genitourinary:        No incontinence   Musculoskeletal: Negative for falls.   Neurological:        As per HPI   Psychiatric/Behavioral: Negative for hallucinations.       Physical examination:   Vitals:    06/07/21 0804   BP: 128/80   BP Location: Left arm   Patient Position: Sitting   BP Cuff Size: Adult   Pulse: 64   Resp: 16   Temp: 36.1 °C (97 °F)   TempSrc: Temporal   SpO2: 99%   Weight: 74.8 kg (165 lb)   Height: 1.727 m (5' 8\")     General: Patient in well nourished in no apparent distress.  Psychiatric: Pleasant.    NEUROLOGICAL EXAM:   Mental status: Awake, alert and fully oriented to person and place. Fair attention and concentration.  MOCA 15/30 (6/2020).  Clock scanned today December 2020.  Knows we are in the middle of the year but does not know the month, day, year.  She remembers what she ate for dinner last night.  Speech and language: Speech is fluent without errors and clear.  Cranial nerve exam:  II: Pupils are equally round and reactive to light. Visual fields are intact by confrontation. Prior exam  III, IV, VI: EOMI, no diplopia, no ptosis. Prior exam  V: Sensation to light touch is normal over V1-3 distributions bilaterally.   Prior exam  VII: Facial movements are symmetrical. There is no facial droop.   VIII: Hearing intact to soft speech  IX: Dysarthria is not present.  XI: Shoulder shrug are symmetrical and strong. Prior exam  XII: Tongue " protrudes midline. Prior exam    Motor exam:  Detailed motor exam from prior exam as documented below.  Muscle tone is normal in all 4 limbs. and No abnormal movements appreciated.    Muscle strength:     Right  Left  Deltoid   5/5  5/5      Biceps   5/5  5/5  Triceps  5/5  5/5   Wrist extensors 5/5  5/5  Wrist flexors  5/5  5/5     5/5  5/5  Interossei  5/5  5/5  Thenar (APB)  NT/5  NT/5   Hip flexors  5/5  5/5  Quadriceps  5/5  5/5    Hamstrings  5/5  5/5  Dorsiflexors  5/5  5/5  Plantarflexors  5/5  5/5  Toe extension  NT/5  NT/5  NT = not tested    Sensory exam: Prior exam  Intact to Light touch, Temperature and Vibration in bilateral upper and lower extremity.    Deep tendon reflexes: Prior exam     Right  Left  Biceps   1/4  1/4  Triceps  1/4  1/4  Brachioradialis 1/4  1/4  Knee jerk  3/4  3/4  Ankle jerk  0/4  0/4   bilateral toes are downgoing to plantar stimulation.   + Glabellar today    Coordination: shows a normal finger-nose-finger, prior exam  Gait: Casual gait is normal.      ANCILLARY DATA REVIEWED:   Lab Data Review:  Lab Results   Component Value Date/Time    WBC 7.7 04/06/2013 02:30 AM    RBC 4.48 04/06/2013 02:30 AM    HEMOGLOBIN 12.4 04/06/2013 02:30 AM    HEMATOCRIT 38.7 04/06/2013 02:30 AM    MCV 86.5 04/06/2013 02:30 AM    MCH 27.7 04/06/2013 02:30 AM    MCHC 32.0 04/06/2013 02:30 AM    MPV 9.7 04/06/2013 02:30 AM    NEUTSPOLYS 55.1 04/06/2013 02:30 AM    LYMPHOCYTES 32.8 04/06/2013 02:30 AM    MONOCYTES 9.4 (H) 04/06/2013 02:30 AM    EOSINOPHILS 2.1 04/06/2013 02:30 AM    BASOPHILS 0.6 04/06/2013 02:30 AM    HYPOCHROMIA 1+ 04/06/2013 02:30 AM      Lab Results   Component Value Date/Time    SODIUM 138 04/06/2013 02:30 AM    POTASSIUM 3.9 04/06/2013 02:30 AM    CHLORIDE 111 04/06/2013 02:30 AM    CO2 22 04/06/2013 02:30 AM    GLUCOSE 91 04/06/2013 02:30 AM    BUN 12 04/06/2013 02:30 AM    CREATININE 0.59 04/06/2013 02:30 AM       Lab Results  Component Value Date/Time   ASTSHARADOT 52 (H)  04/05/2013 1435   ALTSGPT 44 04/05/2013 1435   ALKPHOSPHAT 117 (H) 04/05/2013 1435   ALBUMIN 3.9 04/05/2013 1435     Labs received July 2019:  Copper 142, normal  B12 362, normal MMA at 136  TSH slightly elevated 5.3 with normal free T4 1.5  HIV and RPR negative  PET Scan 11/2020:  Hypometabolism seen in the bilateral parietal and to lesser degree bilateral temporal regions which favors Alzheimer's disease.  EEG ([facility]) 11/2020: This is an abnormal video EEG due to the presence of occasional left temporal slowing most predominant during sleep.     Imaging: None  Records reviewed: None      ASSESSMENT AND PLAN:    1. 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  Repeat MRI of the brain without contrast July 2019 was ordered however on personal visualization does not seem to be as extensive vascular disease as I originally expected.  July 2019 normal/negative copper, B12, MMA, HIV, RPR.  TSH slightly elevated with otherwise normal free T4.  Due to current driving concerns OT evaluation was performed 9/30/2019 result: Fail.  DMV form had been faxed July 2019, license revoked.  No other red flag symptoms or rapidly progressive symptoms suggest need for lumbar puncture at this point in time but may consider if there is change in clinical presentation. MOCA 7/2019 20/30, 6/2020 15/30.  Neuropsychological testing with a diagnosis of major neurocognitive disorder with impaired recall, visual-spatial processing.  Clinically there does seem to be some visual spatial difficulties and cognitive difficulties.  Routine EEG with left temporal slowing.  PET scan brain with hypometabolism in the bilateral parietal and lesser degree temporal region suggestive of Alzheimer's type dementia.   Cannot rule out superimposed vascular disease contributing.  At this point in time I do suspect this is early onset dementia though she is still quite functional.  The history is a bit inconsistent provided also by her boyfriend that there has been no progression and that she has been able to maintain all ADLs and IADLs.  Possible MCI secondary to AD is also a consideration.  Given the recent social situation with 's diagnosis and likely need for family support will continue evaluation to rule out other secondary causes of memory trouble as below.  This will help with future planning.  -Patient not agreeable to go to [facility] due to recent developments of [employer]  -We rediscussed OT driving evaluation which she declined, she did not like the simulator  -We discussed reaching out to the DMV if they have any driving tests that would be more realistic  -Trial of Exelon 1.5 mg twice daily  -Lumbar puncture for evaluation of early onset dementia, rule out secondary causes: CSF protein/glucose/cells, paraneoplastic panel, autoimmune panel, CSF syphilis  -CBC, CMP, PT/INR, B1  -Assess advance care directives on follow-up    2. Cerebrovascular disease, stable: Evident on MRI brain in 2018 with moderate to severe microvascular ischemic changes given her history of hyperlipidemia, hypertension, history of atrial flutter now resolved and not on anticoagulation, smoking.  -Highly recommended smoking cessation  -Continue atorvastatin -patient stopped  -Optimization of blood pressure control of her primary care  -Patient on daily aspirin    3.Tobacco, not addressed today: Not ready to quit, slowly tapering    4. Health maintenance: Colon cancer screening and mammogram ordered, recommend follow-up with primary care    FOLLOW-UP: Return in about 3 months (around 9/7/2021).  Review of LP results and evaluate response to Exelon  EDUCATION AND COUNSELING:  -I personally discussed the following with the patient:    Risks/benefits/side effects/alternatives of medication including but not limited to drowsiness, sedation, dizziness, increased risk for falls, hypersensitivity reactions including rash (which may be fatal), weight changes, GI side effects (gastritis, ulcers, bleeding, changes in appetite, pancreatitis, change in bowel habits), increased risk for depression, anxiety, suicide, psychosis and mood changes, avoid abrupt cessation of medication and hallucinations., The above procedure risks/benefits/side effects/alternatives. and Medical reasoning as above    The patient understands and agrees that due to the complexity of his/her diagnosis, results of any testing and further recommendations will typically be discussed/made during a face to face encounter in my office. The patient and/or family further understands it is their responsibility to keep proper follow up.     Disclaimer  This dictation was created using voice recognition software. I have made every reasonable attempt to avoid dictation errors, but this document may contain an error not identified before finalizing. If the error changes the accuracy of the document, I would appreciate it being brought to my attention. Thank you very much.     Marion Miguel MD  Neurology, Neurophysiology  [facility]

## 2021-06-21 ENCOUNTER — HOSPITAL ENCOUNTER (OUTPATIENT)
Facility: MEDICAL CENTER | Age: 61
End: 2021-06-21
Attending: RADIOLOGY
Payer: MEDICAID

## 2021-06-21 ENCOUNTER — HOSPITAL ENCOUNTER (OUTPATIENT)
Dept: RADIOLOGY | Facility: MEDICAL CENTER | Age: 61
End: 2021-06-21
Attending: PSYCHIATRY & NEUROLOGY
Payer: MEDICAID

## 2021-06-21 DIAGNOSIS — G30.0 EARLY ONSET ALZHEIMER'S DEMENTIA WITHOUT BEHAVIORAL DISTURBANCE (HCC): ICD-10-CM

## 2021-06-21 DIAGNOSIS — F02.80 EARLY ONSET ALZHEIMER'S DEMENTIA WITHOUT BEHAVIORAL DISTURBANCE (HCC): ICD-10-CM

## 2021-06-21 LAB
BURR CELLS/RBC NFR CSF MANUAL: 0 %
CLARITY CSF: CLEAR
COLOR CSF: COLORLESS
COLOR SPUN CSF: COLORLESS
CSF COMMENTS 1658: NORMAL
GLUCOSE CSF-MCNC: 54 MG/DL (ref 40–80)
LYMPHOCYTES NFR CSF: 92 %
MONONUC CELLS NFR CSF: 8 %
PROT CSF-MCNC: 26 MG/DL (ref 15–45)
RBC # CSF: 3 CELLS/UL
SPECIMEN VOL CSF: 15 ML
TUBE # CSF: 3
TUBE # CSF: 3
WBC # CSF: 2 CELLS/UL (ref 0–10)

## 2021-06-21 PROCEDURE — 62328 DX LMBR SPI PNXR W/FLUOR/CT: CPT

## 2021-06-21 PROCEDURE — 82945 GLUCOSE OTHER FLUID: CPT

## 2021-06-21 PROCEDURE — 84157 ASSAY OF PROTEIN OTHER: CPT

## 2021-06-21 PROCEDURE — 83520 IMMUNOASSAY QUANT NOS NONAB: CPT | Mod: 91

## 2021-06-21 PROCEDURE — 62270 DX LMBR SPI PNXR: CPT

## 2021-06-21 PROCEDURE — 86255 FLUORESCENT ANTIBODY SCREEN: CPT | Mod: 91

## 2021-06-21 PROCEDURE — 86780 TREPONEMA PALLIDUM: CPT

## 2021-06-21 PROCEDURE — 86341 ISLET CELL ANTIBODY: CPT

## 2021-06-21 PROCEDURE — 306637 DX-LUMBAR PUNCTURE FOR DIAGNOSIS

## 2021-06-21 PROCEDURE — 89051 BODY FLUID CELL COUNT: CPT

## 2021-06-23 ENCOUNTER — TELEPHONE (OUTPATIENT)
Dept: NEUROLOGY | Facility: MEDICAL CENTER | Age: 61
End: 2021-06-23

## 2021-06-23 NOTE — TELEPHONE ENCOUNTER
----- Message from Marion Miguel M.D. sent at 6/23/2021  8:46 AM PDT -----  Please let the patient know that her initial CSF labs are normal. We are still waiting for additional labs.

## 2021-06-24 LAB — T PALLIDUM AB CSF QL IF: NON REACTIVE

## 2021-06-30 LAB — TEST NAME 95000: NORMAL

## 2021-07-01 LAB
TEST NAME 95000: NORMAL
TEST NAME 95000: NORMAL

## 2021-07-14 DIAGNOSIS — R41.89 IMPAIRED COGNITION: ICD-10-CM

## 2021-07-14 RX ORDER — DONEPEZIL HYDROCHLORIDE 10 MG/1
TABLET, FILM COATED ORAL
Qty: 15 TABLET | Refills: 0 | OUTPATIENT
Start: 2021-07-14

## 2021-07-19 DIAGNOSIS — R41.89 IMPAIRED COGNITION: ICD-10-CM

## 2021-07-19 RX ORDER — DONEPEZIL HYDROCHLORIDE 10 MG/1
TABLET, FILM COATED ORAL
Qty: 20 TABLET | Refills: 0 | OUTPATIENT
Start: 2021-07-19

## 2021-07-19 NOTE — TELEPHONE ENCOUNTER
Received request via: Pharmacy    Was the patient seen in the last year in this department? Yes    Does the patient have an active prescription (recently filled or refills available) for medication(s) requested? No     Patient last seen on 06/07/2021

## 2021-08-20 ENCOUNTER — TELEPHONE (OUTPATIENT)
Dept: NEUROLOGY | Facility: MEDICAL CENTER | Age: 61
End: 2021-08-20

## 2021-09-28 ENCOUNTER — TELEPHONE (OUTPATIENT)
Dept: NEUROLOGY | Facility: MEDICAL CENTER | Age: 61
End: 2021-09-28

## 2021-09-28 NOTE — TELEPHONE ENCOUNTER
The medication I have based on her last discussion should be:  Exelon 1.5 mg twice daily (aka rivastigmine)    Please confirm whether or not she is taking this medication. I started it last visit.     There were repeated prescriptions written for Aricept which she did not previously tolerate and therefore it was canceled.    I encouraged follow-up for her diagnosis trial of medication.

## 2021-09-28 NOTE — TELEPHONE ENCOUNTER
Spencer Monroe called on behalf of this pt. He said that his pharmacy called him about a medication being available, then the order was canceled before the pt could pick it up. He could not not come up with the name of the medication but wanted to follow up to see if it was a med needed by Yaneth. He said that this occurred in the past couple weeks. In looking at his chart/meds, none of the meds on his list fit his supposed time frame. Not sure if you would have any insight onto this but I told him I would pass on the message. Would you like to arrange an appt with the pt? Spencer said they missed their last visit and was curious if they would be advised to come in.

## 2021-09-29 NOTE — TELEPHONE ENCOUNTER
The pt denies currently taking Exelon. Spencer did not remember the last time that she had taken it.     The pt, Yaneth, asked whether she would be able to get her license back so could drive again. I asked if paperwork was needed for this request and if they could provide it, but she said they could not. She said that the DMV requires a medical release that Dr. Miguel would know about or already have?    They wanted to make an appt so I have scheduled them for a follow up in November.

## 2022-07-28 DIAGNOSIS — G30.0 EARLY ONSET ALZHEIMER'S DEMENTIA WITHOUT BEHAVIORAL DISTURBANCE (HCC): ICD-10-CM

## 2022-07-28 DIAGNOSIS — F02.80 EARLY ONSET ALZHEIMER'S DEMENTIA WITHOUT BEHAVIORAL DISTURBANCE (HCC): ICD-10-CM

## 2022-07-28 RX ORDER — RIVASTIGMINE TARTRATE 1.5 MG/1
1.5 CAPSULE ORAL 2 TIMES DAILY
Qty: 60 CAPSULE | Refills: 11 | OUTPATIENT
Start: 2022-07-28

## 2022-07-28 NOTE — TELEPHONE ENCOUNTER
Received request via: Pharmacy    Was the patient seen in the last year in this department? No    Does the patient have an active prescription (recently filled or refills available) for medication(s) requested? Yes.

## 2023-11-14 NOTE — TELEPHONE ENCOUNTER
"Pharmacy states she filled it 4 days ago, I called patient and she was not aware she had picked it up. I advised her she needs to verify that she removes this medication due to her side effects.     I placed a second call to Spencer and Yaneth both informed over the phone of the removal of this med due to side effects.                     ----- Message -----   From: Marion Miguel M.D.   Sent: 8/19/2021   8:51 AM PDT   To: Charlotte Shetty, Med Ass't   Subject: RE: Edit                                         \" Please call the pharmacy and cancel the donepezil/aricept since she is NOT taking it due to side effects. I believe Dr. Valle refilled it while I was out. Please document appropriately and update the med list. \"    "
dr shen

## 2024-10-12 ENCOUNTER — HOSPITAL ENCOUNTER (OUTPATIENT)
Facility: MEDICAL CENTER | Age: 64
End: 2024-10-12
Attending: INTERNAL MEDICINE
Payer: MEDICAID

## 2024-10-12 LAB
APPEARANCE UR: CLEAR
BACTERIA #/AREA URNS HPF: NEGATIVE /HPF
BILIRUB UR QL STRIP.AUTO: NEGATIVE
COLOR UR: YELLOW
EPI CELLS #/AREA URNS HPF: ABNORMAL /HPF
GLUCOSE UR STRIP.AUTO-MCNC: NEGATIVE MG/DL
HYALINE CASTS #/AREA URNS LPF: ABNORMAL /LPF
KETONES UR STRIP.AUTO-MCNC: NEGATIVE MG/DL
LEUKOCYTE ESTERASE UR QL STRIP.AUTO: NEGATIVE
MICRO URNS: ABNORMAL
NITRITE UR QL STRIP.AUTO: NEGATIVE
PH UR STRIP.AUTO: 6.5 [PH] (ref 5–8)
PROT UR QL STRIP: NEGATIVE MG/DL
RBC # URNS HPF: ABNORMAL /HPF
RBC UR QL AUTO: ABNORMAL
SP GR UR STRIP.AUTO: 1.01
UROBILINOGEN UR STRIP.AUTO-MCNC: 0.2 MG/DL
WBC #/AREA URNS HPF: ABNORMAL /HPF

## 2024-10-12 PROCEDURE — 81001 URINALYSIS AUTO W/SCOPE: CPT
